# Patient Record
Sex: MALE | Race: WHITE | NOT HISPANIC OR LATINO | Employment: FULL TIME | URBAN - METROPOLITAN AREA
[De-identification: names, ages, dates, MRNs, and addresses within clinical notes are randomized per-mention and may not be internally consistent; named-entity substitution may affect disease eponyms.]

---

## 2017-03-29 DIAGNOSIS — F33.0 MILD RECURRENT MAJOR DEPRESSION: Chronic | ICD-10-CM

## 2017-03-29 RX ORDER — CITALOPRAM 40 MG/1
40 TABLET, FILM COATED ORAL DAILY
Qty: 30 TABLET | Refills: 5 | Status: SHIPPED | OUTPATIENT
Start: 2017-03-29 | End: 2017-06-28 | Stop reason: SDUPTHER

## 2017-05-08 DIAGNOSIS — R73.03 PREDIABETES: ICD-10-CM

## 2017-05-08 RX ORDER — METFORMIN HYDROCHLORIDE 500 MG/1
500 TABLET ORAL 2 TIMES DAILY WITH MEALS
Qty: 60 TABLET | Refills: 0 | Status: SHIPPED | OUTPATIENT
Start: 2017-05-08 | End: 2017-06-28 | Stop reason: SDUPTHER

## 2017-06-09 DIAGNOSIS — I10 ESSENTIAL HYPERTENSION: Chronic | ICD-10-CM

## 2017-06-09 RX ORDER — LOSARTAN POTASSIUM 50 MG/1
25 TABLET ORAL DAILY
Qty: 15 TABLET | Refills: 1 | Status: SHIPPED | OUTPATIENT
Start: 2017-06-09 | End: 2017-06-28 | Stop reason: SDUPTHER

## 2017-06-28 ENCOUNTER — LAB VISIT (OUTPATIENT)
Dept: LAB | Facility: HOSPITAL | Age: 57
End: 2017-06-28
Attending: INTERNAL MEDICINE
Payer: COMMERCIAL

## 2017-06-28 ENCOUNTER — OFFICE VISIT (OUTPATIENT)
Dept: FAMILY MEDICINE | Facility: CLINIC | Age: 57
End: 2017-06-28
Payer: COMMERCIAL

## 2017-06-28 VITALS
HEART RATE: 78 BPM | TEMPERATURE: 98 F | SYSTOLIC BLOOD PRESSURE: 136 MMHG | RESPIRATION RATE: 18 BRPM | HEIGHT: 68 IN | WEIGHT: 202.69 LBS | OXYGEN SATURATION: 97 % | DIASTOLIC BLOOD PRESSURE: 86 MMHG | BODY MASS INDEX: 30.72 KG/M2

## 2017-06-28 DIAGNOSIS — I10 ESSENTIAL HYPERTENSION: Chronic | ICD-10-CM

## 2017-06-28 DIAGNOSIS — G89.29 CHRONIC BILATERAL LOW BACK PAIN WITHOUT SCIATICA: ICD-10-CM

## 2017-06-28 DIAGNOSIS — R73.03 PREDIABETES: ICD-10-CM

## 2017-06-28 DIAGNOSIS — K21.9 GERD WITHOUT ESOPHAGITIS: ICD-10-CM

## 2017-06-28 DIAGNOSIS — F33.0 MILD RECURRENT MAJOR DEPRESSION: Chronic | ICD-10-CM

## 2017-06-28 DIAGNOSIS — M54.50 CHRONIC BILATERAL LOW BACK PAIN WITHOUT SCIATICA: ICD-10-CM

## 2017-06-28 LAB
ANION GAP SERPL CALC-SCNC: 8 MMOL/L
BUN SERPL-MCNC: 14 MG/DL
CALCIUM SERPL-MCNC: 9.1 MG/DL
CHLORIDE SERPL-SCNC: 105 MMOL/L
CHOLEST/HDLC SERPL: 4.5 {RATIO}
CO2 SERPL-SCNC: 27 MMOL/L
CREAT SERPL-MCNC: 1 MG/DL
EST. GFR  (AFRICAN AMERICAN): >60 ML/MIN/1.73 M^2
EST. GFR  (NON AFRICAN AMERICAN): >60 ML/MIN/1.73 M^2
GLUCOSE SERPL-MCNC: 94 MG/DL
HDL/CHOLESTEROL RATIO: 22.3 %
HDLC SERPL-MCNC: 179 MG/DL
HDLC SERPL-MCNC: 40 MG/DL
LDLC SERPL CALC-MCNC: 116 MG/DL
NONHDLC SERPL-MCNC: 139 MG/DL
POTASSIUM SERPL-SCNC: 4.4 MMOL/L
SODIUM SERPL-SCNC: 140 MMOL/L
TRIGL SERPL-MCNC: 115 MG/DL

## 2017-06-28 PROCEDURE — 99214 OFFICE O/P EST MOD 30 MIN: CPT | Mod: S$GLB,,, | Performed by: INTERNAL MEDICINE

## 2017-06-28 PROCEDURE — 36415 COLL VENOUS BLD VENIPUNCTURE: CPT | Mod: PO

## 2017-06-28 PROCEDURE — 80061 LIPID PANEL: CPT

## 2017-06-28 PROCEDURE — 83036 HEMOGLOBIN GLYCOSYLATED A1C: CPT

## 2017-06-28 PROCEDURE — 80048 BASIC METABOLIC PNL TOTAL CA: CPT

## 2017-06-28 PROCEDURE — 99999 PR PBB SHADOW E&M-EST. PATIENT-LVL III: CPT | Mod: PBBFAC,,, | Performed by: INTERNAL MEDICINE

## 2017-06-28 RX ORDER — CITALOPRAM 40 MG/1
40 TABLET, FILM COATED ORAL DAILY
Qty: 90 TABLET | Refills: 3 | Status: SHIPPED | OUTPATIENT
Start: 2017-06-28 | End: 2018-05-25 | Stop reason: SDUPTHER

## 2017-06-28 RX ORDER — METFORMIN HYDROCHLORIDE 500 MG/1
500 TABLET ORAL 2 TIMES DAILY WITH MEALS
Qty: 180 TABLET | Refills: 3 | Status: SHIPPED | OUTPATIENT
Start: 2017-06-28 | End: 2018-05-25

## 2017-06-28 RX ORDER — MELOXICAM 15 MG/1
15 TABLET ORAL DAILY
Qty: 90 TABLET | Refills: 3 | Status: SHIPPED | OUTPATIENT
Start: 2017-06-28 | End: 2018-05-25 | Stop reason: SDUPTHER

## 2017-06-28 RX ORDER — LOSARTAN POTASSIUM 50 MG/1
25 TABLET ORAL DAILY
Qty: 90 TABLET | Refills: 1 | Status: SHIPPED | OUTPATIENT
Start: 2017-06-28 | End: 2018-05-25 | Stop reason: SDUPTHER

## 2017-06-28 NOTE — ASSESSMENT & PLAN NOTE
Controlled with low dose ranitidine.  The current medical regimen is effective;  continue present plan and medications.

## 2017-06-28 NOTE — ASSESSMENT & PLAN NOTE
The current medical regimen is effective;  continue present plan and medications. He is on twice daily metformin currently.  May benefit from once daily extended release.

## 2017-06-28 NOTE — PROGRESS NOTES
Chief Complaint  Chief Complaint   Patient presents with    Annual Exam       HPI  Miquel Luciano is a 56 y.o. male with multiple medical diagnoses as listed in the medical history and problem list that presents for establish care.  Pt is new to me but is known to this clinic with his last appointment being 11/2016.  The patient was previously followed by one of my partners that is no longer at this practice .  I have reviewed their most recent previous OV with their previous PCP, most recent labs and most recent imaging studies and interpreted them myself.  He is taking and tolerating his medications daily.  His HM is up to date.  No CP, SOB, palpitations, leg swelling.  His depression has been well controlled with celexa.  He is only taking a 25mg of losartan.  Not on low sodium diet.        PAST MEDICAL HISTORY:  Past Medical History:   Diagnosis Date    Depression     Hypertension     Prediabetes        PAST SURGICAL HISTORY:  Past Surgical History:   Procedure Laterality Date    COLONOSCOPY N/A 4/8/2016    Procedure: COLONOSCOPY;  Surgeon: Fortino Perez MD;  Location: [facility];  Service: Endoscopy;  Laterality: N/A;    EYE SURGERY      lens implant    FINGER AMPUTATION      NASAL SEPTUM SURGERY      VASECTOMY         SOCIAL HISTORY:  Social History     Social History    Marital status:      Spouse name: N/A    Number of children: N/A    Years of education: N/A     Occupational History    Not on file.     Social History Main Topics    Smoking status: Former Smoker    Smokeless tobacco: Former User     Quit date: 6/12/2015    Alcohol use Not on file    Drug use: Unknown    Sexual activity: Not on file     Other Topics Concern    Not on file     Social History Narrative    No narrative on file       FAMILY HISTORY:  History reviewed. No pertinent family history.    ALLERGIES AND MEDICATIONS: updated and reviewed.  Review of patient's allergies indicates:   Allergen Reactions     Codeine Nausea Only     Current Outpatient Prescriptions   Medication Sig Dispense Refill    citalopram (CELEXA) 40 MG tablet Take 1 tablet (40 mg total) by mouth once daily. 90 tablet 3    fish oil-omega-3 fatty acids 300-1,000 mg capsule Take 2 g by mouth once daily.      losartan (COZAAR) 50 MG tablet Take 0.5 tablets (25 mg total) by mouth once daily. 90 tablet 1    meloxicam (MOBIC) 15 MG tablet Take 1 tablet (15 mg total) by mouth once daily. 90 tablet 3    metformin (GLUCOPHAGE) 500 MG tablet Take 1 tablet (500 mg total) by mouth 2 (two) times daily with meals. 180 tablet 3    ranitidine (ZANTAC) 75 MG tablet Take 75 mg by mouth 2 (two) times daily.       No current facility-administered medications for this visit.          ROS  Review of Systems   Constitutional: Negative for chills, fatigue, fever and unexpected weight change.   HENT: Negative for congestion, dental problem, ear discharge, ear pain, hearing loss, postnasal drip, rhinorrhea, sinus pressure, sore throat and trouble swallowing.    Eyes: Negative for pain, discharge, redness, itching and visual disturbance.   Respiratory: Negative for cough, chest tightness, shortness of breath and wheezing.    Cardiovascular: Negative for chest pain, palpitations and leg swelling.   Gastrointestinal: Negative for abdominal distention, abdominal pain, blood in stool, constipation, diarrhea, nausea and vomiting.        No melena   Endocrine: Negative for cold intolerance, heat intolerance, polydipsia, polyphagia and polyuria.        No nocturia   Genitourinary: Negative for decreased urine volume, difficulty urinating, discharge, dysuria, flank pain, frequency, hematuria, penile pain, penile swelling, scrotal swelling, testicular pain and urgency.   Musculoskeletal: Positive for back pain. Negative for arthralgias, gait problem, joint swelling, myalgias, neck pain and neck stiffness.   Skin: Negative for color change, pallor and rash.   Neurological:  "Negative for dizziness, tremors, seizures, syncope, weakness, light-headedness and headaches.   Hematological: Negative for adenopathy. Does not bruise/bleed easily.   Psychiatric/Behavioral: Negative for confusion, decreased concentration, sleep disturbance and suicidal ideas. The patient is not nervous/anxious.         No depression         Physical Exam  Vitals:    06/28/17 1315 06/28/17 1341   BP: (!) 140/80 136/86   BP Location: Left arm    Patient Position: Sitting    BP Method: Manual    Pulse: 78    Resp: 18    Temp: 97.9 °F (36.6 °C)    TempSrc: Oral    SpO2: 97%    Weight: 92 kg (202 lb 11.4 oz)    Height: 5' 8" (1.727 m)     Body mass index is 30.82 kg/m².  Weight: 92 kg (202 lb 11.4 oz)   Height: 5' 8" (172.7 cm)   General appearance: alert, appears stated age, cooperative and no distress  Head: Normocephalic, without obvious abnormality, atraumatic  Neck: no adenopathy, no carotid bruit, no JVD, supple, symmetrical, trachea midline and thyroid not enlarged, symmetric, no tenderness/mass/nodules  Back: no midline paraspinous or lumbar TTP  Lungs: clear to auscultation bilaterally  Heart: regular rate and rhythm, S1, S2 normal, no murmur, click, rub or gallop  Abdomen: soft, non-tender; bowel sounds normal; no masses,  no organomegaly  Extremities: extremities normal, atraumatic, no cyanosis or edema  Pulses: 2+ and symmetric  Neurologic: Mental status: Alert, oriented, thought content appropriate  Sensory: normal  Motor:grossly normal  Coordination: normal  Gait: Normal  Symmetric facial movements palate elevated symmetrically tongue midline     Health Maintenance       Date Due Completion Date    TETANUS VACCINE 10/16/2019 10/16/2009 (Done)    Override on 10/16/2009: Done    Lipid Panel 11/11/2021 11/11/2016    Colonoscopy 04/08/2026 4/8/2016            Assessment & Plan  Problem List Items Addressed This Visit        Neuro    Mild recurrent major depression (Chronic)    Current Assessment & Plan     " The current medical regimen is effective;  continue present plan and medications.          Relevant Medications    citalopram (CELEXA) 40 MG tablet    Chronic bilateral low back pain without sciatica (Chronic)    Current Assessment & Plan     The current medical regimen is effective;  continue present plan and medications.          Relevant Medications    meloxicam (MOBIC) 15 MG tablet       Cardiac    Essential hypertension (Chronic)    Current Assessment & Plan     The current medical regimen is effective;  continue present plan and medications. Counseled on low sodium diet and information provided on his AVS         Relevant Medications    losartan (COZAAR) 50 MG tablet    Other Relevant Orders    Basic metabolic panel    Lipid panel       Fluids/Electrolytes/Nutrition/GI    GERD without esophagitis (Chronic)    Current Assessment & Plan     Controlled with low dose ranitidine.  The current medical regimen is effective;  continue present plan and medications.             Other    Prediabetes (Chronic)    Current Assessment & Plan     The current medical regimen is effective;  continue present plan and medications. He is on twice daily metformin currently.  May benefit from once daily extended release.           Relevant Medications    metformin (GLUCOPHAGE) 500 MG tablet    Other Relevant Orders    Hemoglobin A1c      Other Visit Diagnoses    None.           Health Maintenance reviewed, up to date.    Follow-up: Return in about 1 year (around 6/28/2018) for Routine Physical.

## 2017-06-28 NOTE — PATIENT INSTRUCTIONS
Low-Salt Diet  This diet removes foods that are high in salt. It also limits the amount of salt you use when cooking. It is most often used for people with high blood pressure, edema (fluid retention), and kidney, liver, or heart disease.  Table salt contains the mineral sodium. Your body needs sodium to work normally. But too much sodium can make your health problems worse. Your healthcare provider is recommending a low-salt (also called low-sodium) diet for you. Your total daily allowance of salt is 1,500 to 2,300 milligrams (mg). It is less than 1 teaspoon of table salt. This means you can have only about 500 to 700 mg of sodium at each meal. People with certain health problems should limit salt intake to the lower end of the recommended range.    When you cook, dont add much salt. If you can cook without using salt, even better. Dont add salt to your food at the table.  When shopping, read food labels. Salt is often called sodium on the label. Choose foods that are salt-free, low salt, or very low salt. Note that foods with reduced salt may not lower your salt intake enough.    Beans, potatoes, and pasta  Ok: Dry beans, split peas, lentils, potatoes, rice, macaroni, pasta, spaghetti without added salt  Avoid: Potato chips, tortilla chips, and similar products  Breads and cereals  Ok: Low-sodium breads, rolls, cereals, and cakes; low-salt crackers, matzo crackers  Avoid: Salted crackers, pretzels, popcorn, Indonesian toast, pancakes, muffins  Dairy  Ok: Milk, chocolate milk, hot chocolate mix, low-salt cheeses, and yogurt  Avoid: Processed cheese and cheese spreads; Roquefort, Camembert, and cottage cheese; buttermilk, instant breakfast drink  Desserts  Ok: Ice cream, frozen yogurt, juice bars, gelatin, cookies and pies, sugar, honey, jelly, hard candy  Avoid: Most pies, cakes and cookies prepared or processed with salt; instant pudding  Drinks  Ok: Tea, coffee, fizzy (carbonated) drinks, juices  Avoid: Flavored  coffees, electrolyte replacement drinks, sports drinks  Meats  Ok: All fresh meat, fish, poultry, low-salt tuna, eggs, egg substitute  Avoid: Smoked, pickled, brine-cured, or salted meats and fish. This includes boss, chipped beef, corned beef, hot dogs, deli meats, ham, kosher meats, salt pork, sausage, canned tuna, salted codfish, smoked salmon, herring, sardines, or anchovies.  Seasonings and spices  Ok: Most seasonings are okay. Good substitutes for salt include: fresh herb blends, hot sauce, lemon, garlic, velasquez, vinegar, dry mustard, parsley, cilantro, horseradish, tomato paste, regular margarine, mayonnaise, unsalted butter, cream cheese, vegetable oil, cream, low-salt salad dressing and gravy.  Avoid: Regular ketchup, relishes, pickles, soy sauce, teriyaki sauce, Worcestershire sauce, BBQ sauce, tartar sauce, meat tenderizer, chili sauce, regular gravy, regular salad dressing, salted butter  Soups  Ok: Low-salt soups and broths made with allowed foods  Avoid: Bouillon cubes, soups with smoked or salted meats, regular soup and broth  Vegetables  Ok: Most vegetables are okay; also low-salt tomato and vegetable juices  Avoid: Sauerkraut and other brine-soaked vegetables; pickles and other pickled vegetables; tomato juice, olives  Date Last Reviewed: 8/1/2016 © 2000-2016 [employer]. [address] [place] [address]. All rights reserved. This information is not intended as a substitute for professional medical care. Always follow your healthcare professional's instructions.        Discharge Instructions: Eating a Low-Salt Diet  Your health care provider has prescribed a low-salt diet for you. Most people with heart problems need to eat less salt, which is full of sodium. Too much sodium is linked to high blood pressure, which is linked to a greater risk of heart disease, stroke, blindness, and kidney problems.  Home care    Learn ways to cut back on salt (sodium):  · Eat less frozen,  canned, dried, packaged, and fast foods. These often contain high amounts of sodium.  · Season foods with herbs instead of salt when you cook.  · Season with flavorings such as pepper, lemon, garlic, and onion.  · Dont add salt to your food at the table.  · Sprinkle salt-free herbal blends on meats and vegetables.  Learn to read food labels carefully:  · Look for the total amount of sodium per serving.  · Look for foods labeled low sodium, reduced sodium, or no added salt.  · Beware. Salt goes by many names. Cut down on foods with these words (all forms of salt) listed as ingredients:  ¨ Salt  ¨ Sodium  ¨ Soy sauce  ¨ Baking soda  ¨ Baking powder  ¨ MSG  ¨ Monosodium  ¨ Na (the chemical symbol for sodium)  Other ideas:  · Use more fresh food. Buy more fruits and vegetables.  · Select lean meats, fish, and poultry.  · Find a cookbook with low-salt recipes. Youll find ideas for tasty meals that are healthy for your heart.  ·   When eating out, ask questions about the menu. Tell the  you're on a low-salt diet.  ¨ If you order fish, chicken, beef, or pork, ask the  to have it broiled, baked, poached, or grilled without salt, butter, or breading.  ¨ Choose plain steamed rice, boiled noodles, and baked or boiled potatoes. Top potatoes with chives and a little sour cream instead of butter.  · Avoid antacids that are high in salt. Check the label before you buy.  Follow-up  Make a follow-up appointment with a nutritionist as directed by our staff.  Date Last Reviewed: 6/20/2015  © 4923-6457 [employer]. [address] [place] [address], [place] [address]. All rights reserved. This information is not intended as a substitute for professional medical care. Always follow your healthcare professional's instructions.

## 2017-06-29 ENCOUNTER — TELEPHONE (OUTPATIENT)
Dept: FAMILY MEDICINE | Facility: CLINIC | Age: 57
End: 2017-06-29

## 2017-06-29 LAB
ESTIMATED AVG GLUCOSE: 128 MG/DL
HBA1C MFR BLD HPLC: 6.1 %

## 2017-06-29 NOTE — TELEPHONE ENCOUNTER
Please inform the patient that his labs look good and the sugars are improved.  His paperwork is ready to     Thank you,  Pepe

## 2018-05-25 ENCOUNTER — LAB VISIT (OUTPATIENT)
Dept: LAB | Facility: HOSPITAL | Age: 58
End: 2018-05-25
Attending: INTERNAL MEDICINE
Payer: COMMERCIAL

## 2018-05-25 ENCOUNTER — OFFICE VISIT (OUTPATIENT)
Dept: FAMILY MEDICINE | Facility: CLINIC | Age: 58
End: 2018-05-25
Payer: COMMERCIAL

## 2018-05-25 VITALS
DIASTOLIC BLOOD PRESSURE: 88 MMHG | BODY MASS INDEX: 30.46 KG/M2 | HEART RATE: 95 BPM | TEMPERATURE: 98 F | SYSTOLIC BLOOD PRESSURE: 132 MMHG | HEIGHT: 68 IN | WEIGHT: 201 LBS | OXYGEN SATURATION: 98 %

## 2018-05-25 DIAGNOSIS — Z00.00 ROUTINE MEDICAL EXAM: Primary | ICD-10-CM

## 2018-05-25 DIAGNOSIS — Z00.00 ROUTINE MEDICAL EXAM: ICD-10-CM

## 2018-05-25 DIAGNOSIS — G89.29 CHRONIC BILATERAL LOW BACK PAIN WITHOUT SCIATICA: Chronic | ICD-10-CM

## 2018-05-25 DIAGNOSIS — I10 ESSENTIAL HYPERTENSION: Chronic | ICD-10-CM

## 2018-05-25 DIAGNOSIS — M54.50 CHRONIC BILATERAL LOW BACK PAIN WITHOUT SCIATICA: Chronic | ICD-10-CM

## 2018-05-25 DIAGNOSIS — R73.03 PREDIABETES: Chronic | ICD-10-CM

## 2018-05-25 DIAGNOSIS — L82.1 SEBORRHEIC KERATOSES: ICD-10-CM

## 2018-05-25 DIAGNOSIS — K21.9 GERD WITHOUT ESOPHAGITIS: Chronic | ICD-10-CM

## 2018-05-25 DIAGNOSIS — F33.0 MILD RECURRENT MAJOR DEPRESSION: Chronic | ICD-10-CM

## 2018-05-25 LAB
ALBUMIN SERPL BCP-MCNC: 3.8 G/DL
ALP SERPL-CCNC: 77 U/L
ALT SERPL W/O P-5'-P-CCNC: 19 U/L
ANION GAP SERPL CALC-SCNC: 7 MMOL/L
AST SERPL-CCNC: 17 U/L
BASOPHILS # BLD AUTO: 0.04 K/UL
BASOPHILS NFR BLD: 0.9 %
BILIRUB SERPL-MCNC: 0.5 MG/DL
BUN SERPL-MCNC: 14 MG/DL
CALCIUM SERPL-MCNC: 9.4 MG/DL
CHLORIDE SERPL-SCNC: 106 MMOL/L
CHOLEST SERPL-MCNC: 171 MG/DL
CHOLEST/HDLC SERPL: 4.5 {RATIO}
CO2 SERPL-SCNC: 28 MMOL/L
CREAT SERPL-MCNC: 1 MG/DL
DIFFERENTIAL METHOD: NORMAL
EOSINOPHIL # BLD AUTO: 0.2 K/UL
EOSINOPHIL NFR BLD: 5.2 %
ERYTHROCYTE [DISTWIDTH] IN BLOOD BY AUTOMATED COUNT: 13.3 %
EST. GFR  (AFRICAN AMERICAN): >60 ML/MIN/1.73 M^2
EST. GFR  (NON AFRICAN AMERICAN): >60 ML/MIN/1.73 M^2
ESTIMATED AVG GLUCOSE: 123 MG/DL
GLUCOSE SERPL-MCNC: 102 MG/DL
HBA1C MFR BLD HPLC: 5.9 %
HCT VFR BLD AUTO: 43.7 %
HDLC SERPL-MCNC: 38 MG/DL
HDLC SERPL: 22.2 %
HGB BLD-MCNC: 14.1 G/DL
IMM GRANULOCYTES # BLD AUTO: 0.01 K/UL
IMM GRANULOCYTES NFR BLD AUTO: 0.2 %
LDLC SERPL CALC-MCNC: 108.4 MG/DL
LYMPHOCYTES # BLD AUTO: 1.4 K/UL
LYMPHOCYTES NFR BLD: 31.3 %
MCH RBC QN AUTO: 28.7 PG
MCHC RBC AUTO-ENTMCNC: 32.3 G/DL
MCV RBC AUTO: 89 FL
MONOCYTES # BLD AUTO: 0.3 K/UL
MONOCYTES NFR BLD: 7.2 %
NEUTROPHILS # BLD AUTO: 2.5 K/UL
NEUTROPHILS NFR BLD: 55.2 %
NONHDLC SERPL-MCNC: 133 MG/DL
NRBC BLD-RTO: 0 /100 WBC
PLATELET # BLD AUTO: 264 K/UL
PMV BLD AUTO: 10.7 FL
POTASSIUM SERPL-SCNC: 4.6 MMOL/L
PROT SERPL-MCNC: 6.7 G/DL
RBC # BLD AUTO: 4.91 M/UL
SODIUM SERPL-SCNC: 141 MMOL/L
TRIGL SERPL-MCNC: 123 MG/DL
WBC # BLD AUTO: 4.44 K/UL

## 2018-05-25 PROCEDURE — 83036 HEMOGLOBIN GLYCOSYLATED A1C: CPT

## 2018-05-25 PROCEDURE — 99999 PR PBB SHADOW E&M-EST. PATIENT-LVL III: CPT | Mod: PBBFAC,,, | Performed by: INTERNAL MEDICINE

## 2018-05-25 PROCEDURE — 85025 COMPLETE CBC W/AUTO DIFF WBC: CPT

## 2018-05-25 PROCEDURE — 99396 PREV VISIT EST AGE 40-64: CPT | Mod: S$GLB,,, | Performed by: INTERNAL MEDICINE

## 2018-05-25 PROCEDURE — 36415 COLL VENOUS BLD VENIPUNCTURE: CPT | Mod: PO

## 2018-05-25 PROCEDURE — 3075F SYST BP GE 130 - 139MM HG: CPT | Mod: CPTII,S$GLB,, | Performed by: INTERNAL MEDICINE

## 2018-05-25 PROCEDURE — 80061 LIPID PANEL: CPT

## 2018-05-25 PROCEDURE — 80053 COMPREHEN METABOLIC PANEL: CPT

## 2018-05-25 PROCEDURE — 3079F DIAST BP 80-89 MM HG: CPT | Mod: CPTII,S$GLB,, | Performed by: INTERNAL MEDICINE

## 2018-05-25 RX ORDER — LOSARTAN POTASSIUM 25 MG/1
25 TABLET ORAL DAILY
Qty: 90 TABLET | Refills: 3 | Status: SHIPPED | OUTPATIENT
Start: 2018-05-25 | End: 2018-08-17

## 2018-05-25 RX ORDER — MELOXICAM 15 MG/1
15 TABLET ORAL DAILY
Qty: 90 TABLET | Refills: 3 | Status: SHIPPED | OUTPATIENT
Start: 2018-05-25 | End: 2019-05-29 | Stop reason: SDUPTHER

## 2018-05-25 RX ORDER — METFORMIN HYDROCHLORIDE 500 MG/1
500 TABLET, EXTENDED RELEASE ORAL
Qty: 90 TABLET | Refills: 3 | Status: SHIPPED | OUTPATIENT
Start: 2018-05-25 | End: 2019-05-29 | Stop reason: SDUPTHER

## 2018-05-25 RX ORDER — CITALOPRAM 40 MG/1
40 TABLET, FILM COATED ORAL DAILY
Qty: 90 TABLET | Refills: 3 | Status: SHIPPED | OUTPATIENT
Start: 2018-05-25 | End: 2019-05-29 | Stop reason: SDUPTHER

## 2018-05-25 NOTE — PROGRESS NOTES
Assessment & Plan  Problem List Items Addressed This Visit        Psychiatric    Mild recurrent major depression (Chronic)    Current Assessment & Plan     The current medical regimen is effective;  continue present plan and medications.          Relevant Medications    citalopram (CELEXA) 40 MG tablet       Cardiac/Vascular    Essential hypertension (Chronic)    Current Assessment & Plan     The current medical regimen is effective;  continue present plan and medications. Continue home monitoring.          Relevant Medications    losartan (COZAAR) 25 MG tablet    Other Relevant Orders    Comprehensive metabolic panel    CBC auto differential    Lipid panel       Endocrine    Prediabetes (Chronic)    Current Assessment & Plan     Change to XR to simplify regimen.          Relevant Medications    metFORMIN (GLUCOPHAGE-XR) 500 MG 24 hr tablet    Other Relevant Orders    Hemoglobin A1c       GI    GERD without esophagitis (Chronic)    Current Assessment & Plan     Continue PRN ranitidine            Orthopedic    Chronic bilateral low back pain without sciatica (Chronic)    Current Assessment & Plan     The current medical regimen is effective;  continue present plan and medications.          Relevant Medications    meloxicam (MOBIC) 15 MG tablet      Other Visit Diagnoses     Routine medical exam    -  Primary  -  Discussed healthy diet, regular exercise, necessary labs, age appropriate cancer screening, and routine vaccinations.  The natural history of prostate cancer and ongoing controversy regarding screening and potential treatment outcomes of prostate cancer has been discussed with the patient. The meaning of a false positive PSA and a false negative PSA has been discussed. He indicates understanding of the limitations of this screening test and wishes NOT to proceed with screening PSA testing.       Relevant Orders    Comprehensive metabolic panel    CBC auto differential    Lipid panel    Hemoglobin A1c     Seborrheic keratoses    -  Referred to derm at pt request    Relevant Orders    Ambulatory consult to Dermatology            Health Maintenance reviewed, up to date.    Follow-up: Follow-up in about 1 year (around 5/25/2019) for Routine Physical.    ______________________________________________________________________    Chief Complaint  Chief Complaint   Patient presents with    Annual Exam       HPI  Miquel Luciano is a 57 y.o. male with multiple medical diagnoses as listed in the medical history and problem list that presents for routine physical.  Pt is known to me with his last appointment 06/2017.      No acute complaints.  He checks his BP at home at times and he reports that it is 130s/85.        PAST MEDICAL HISTORY:  Past Medical History:   Diagnosis Date    Depression     Hypertension     Prediabetes        PAST SURGICAL HISTORY:  Past Surgical History:   Procedure Laterality Date    COLONOSCOPY N/A 4/8/2016    Procedure: COLONOSCOPY;  Surgeon: Fortino Perez MD;  Location: [facility];  Service: Endoscopy;  Laterality: N/A;    EYE SURGERY      lens implant    FINGER AMPUTATION      NASAL SEPTUM SURGERY      VASECTOMY         SOCIAL HISTORY:  Social History     Social History    Marital status:      Spouse name: N/A    Number of children: N/A    Years of education: N/A     Occupational History    Not on file.     Social History Main Topics    Smoking status: Former Smoker    Smokeless tobacco: Former User     Quit date: 6/12/2015    Alcohol use Not on file    Drug use: Unknown    Sexual activity: Not on file     Other Topics Concern    Not on file     Social History Narrative    No narrative on file       FAMILY HISTORY:  History reviewed. No pertinent family history.    ALLERGIES AND MEDICATIONS: updated and reviewed.  Review of patient's allergies indicates:   Allergen Reactions    Codeine Nausea Only     Current Outpatient Prescriptions   Medication Sig Dispense Refill  "   citalopram (CELEXA) 40 MG tablet Take 1 tablet (40 mg total) by mouth once daily. 90 tablet 3    fish oil-omega-3 fatty acids 300-1,000 mg capsule Take 2 g by mouth once daily.      losartan (COZAAR) 25 MG tablet Take 1 tablet (25 mg total) by mouth once daily. 90 tablet 3    meloxicam (MOBIC) 15 MG tablet Take 1 tablet (15 mg total) by mouth once daily. 90 tablet 3    ranitidine (ZANTAC) 75 MG tablet Take 75 mg by mouth 2 (two) times daily.      metFORMIN (GLUCOPHAGE-XR) 500 MG 24 hr tablet Take 1 tablet (500 mg total) by mouth daily with breakfast. 90 tablet 3     No current facility-administered medications for this visit.          ROS  Review of Systems   Constitutional: Negative for chills, fever and unexpected weight change.   HENT: Negative for congestion, dental problem, ear pain, hearing loss, rhinorrhea, sore throat and trouble swallowing.    Eyes: Negative for discharge, redness and visual disturbance.   Respiratory: Negative for cough, chest tightness, shortness of breath and wheezing.    Cardiovascular: Negative for chest pain, palpitations and leg swelling.   Gastrointestinal: Negative for abdominal pain, constipation, diarrhea, nausea and vomiting.   Endocrine: Negative for polydipsia, polyphagia and polyuria.   Genitourinary: Negative for decreased urine volume, dysuria and hematuria.   Musculoskeletal: Positive for back pain. Negative for arthralgias and myalgias.   Skin: Negative for color change and rash.   Neurological: Negative for dizziness, weakness, light-headedness and headaches.   Psychiatric/Behavioral: Negative for decreased concentration, dysphoric mood, sleep disturbance and suicidal ideas.           Physical Exam  Vitals:    05/25/18 1127 05/25/18 1204   BP: (!) 140/100 132/88   Pulse: 95    Temp: 98.3 °F (36.8 °C)    SpO2: 98%    Weight: 91.2 kg (201 lb)    Height: 5' 8" (1.727 m)     Body mass index is 30.56 kg/m².  Weight: 91.2 kg (201 lb)   Height: 5' 8" (172.7 cm) "   Physical Exam   Constitutional: He is oriented to person, place, and time. He appears well-developed and well-nourished. No distress.   HENT:   Head: Normocephalic and atraumatic.   Right Ear: Tympanic membrane, external ear and ear canal normal.   Left Ear: Tympanic membrane, external ear and ear canal normal.   Nose: Nose normal. No septal deviation. Right sinus exhibits no maxillary sinus tenderness and no frontal sinus tenderness. Left sinus exhibits no maxillary sinus tenderness and no frontal sinus tenderness.   Mouth/Throat: Uvula is midline, oropharynx is clear and moist and mucous membranes are normal. No tonsillar exudate.   Eyes: Conjunctivae and EOM are normal. Pupils are equal, round, and reactive to light. Right eye exhibits no discharge. Left eye exhibits no discharge. No scleral icterus.   Neck: Neck supple. No JVD present. No spinous process tenderness and no muscular tenderness present. Carotid bruit is not present. No tracheal deviation present. No thyroid mass and no thyromegaly present.   Cardiovascular: Normal rate, regular rhythm, normal heart sounds and intact distal pulses.  Exam reveals no S3, no S4 and no friction rub.    No murmur heard.  Pulmonary/Chest: Effort normal and breath sounds normal. He has no wheezes. He has no rhonchi. He has no rales.   Abdominal: Soft. Bowel sounds are normal. He exhibits no distension. There is no tenderness. There is no rebound, no guarding and no CVA tenderness.   Musculoskeletal: Normal range of motion. He exhibits no edema or tenderness.   Lymphadenopathy:        Head (right side): No submental and no submandibular adenopathy present.        Head (left side): No submental and no submandibular adenopathy present.     He has no cervical adenopathy.   Neurological: He is alert and oriented to person, place, and time. Coordination normal.   Motor grossly intact.  Sensation grossly normal.  Symmetric facial movements palate elevated symmetrically tongue  midline    Skin: Skin is warm and dry. Capillary refill takes less than 2 seconds. No rash noted. No cyanosis. Nails show no clubbing.   Psychiatric: He has a normal mood and affect. His speech is normal and behavior is normal. Thought content normal. Cognition and memory are normal.         Health Maintenance       Date Due Completion Date    TETANUS VACCINE 10/16/2019 10/16/2009 (Done)    Override on 10/16/2009: Done    Lipid Panel 06/28/2022 6/28/2017    Colonoscopy 04/08/2026 4/8/2016

## 2018-05-25 NOTE — ASSESSMENT & PLAN NOTE
The current medical regimen is effective;  continue present plan and medications. Continue home monitoring.

## 2018-05-28 ENCOUNTER — TELEPHONE (OUTPATIENT)
Dept: FAMILY MEDICINE | Facility: CLINIC | Age: 58
End: 2018-05-28

## 2018-05-28 NOTE — TELEPHONE ENCOUNTER
Please inform the patient that this labs looked stable to slightly improved.      I filled out his [employer] paperwork for his insurance and it is ready to fax.     Thank you,  Pepe

## 2018-06-01 ENCOUNTER — TELEPHONE (OUTPATIENT)
Dept: FAMILY MEDICINE | Facility: CLINIC | Age: 58
End: 2018-06-01

## 2018-06-01 NOTE — TELEPHONE ENCOUNTER
"----- Message from Dori Soliman sent at 6/1/2018  2:48 PM CDT -----  Contact: Self/ [phone]  Pt returning call to office about his "physical" paperwork. Thank you.  "

## 2018-08-16 DIAGNOSIS — I10 ESSENTIAL HYPERTENSION: Chronic | ICD-10-CM

## 2018-08-17 RX ORDER — LOSARTAN POTASSIUM 50 MG/1
TABLET ORAL
Qty: 15 TABLET | Refills: 11 | Status: SHIPPED | OUTPATIENT
Start: 2018-08-17 | End: 2019-09-12 | Stop reason: SDUPTHER

## 2018-10-04 DIAGNOSIS — R73.03 PREDIABETES: ICD-10-CM

## 2018-10-05 RX ORDER — METFORMIN HYDROCHLORIDE 500 MG/1
TABLET ORAL
Qty: 90 TABLET | Refills: 1 | OUTPATIENT
Start: 2018-10-05

## 2019-05-29 DIAGNOSIS — R73.03 PREDIABETES: Chronic | ICD-10-CM

## 2019-05-29 DIAGNOSIS — M54.50 CHRONIC BILATERAL LOW BACK PAIN WITHOUT SCIATICA: Chronic | ICD-10-CM

## 2019-05-29 DIAGNOSIS — F33.0 MILD RECURRENT MAJOR DEPRESSION: Chronic | ICD-10-CM

## 2019-05-29 DIAGNOSIS — G89.29 CHRONIC BILATERAL LOW BACK PAIN WITHOUT SCIATICA: Chronic | ICD-10-CM

## 2019-05-29 DIAGNOSIS — I10 ESSENTIAL HYPERTENSION: Chronic | ICD-10-CM

## 2019-05-30 RX ORDER — CITALOPRAM 40 MG/1
TABLET, FILM COATED ORAL
Qty: 90 TABLET | Refills: 0 | Status: SHIPPED | OUTPATIENT
Start: 2019-05-30 | End: 2019-09-12 | Stop reason: SDUPTHER

## 2019-05-30 RX ORDER — MELOXICAM 15 MG/1
TABLET ORAL
Qty: 90 TABLET | Refills: 0 | Status: SHIPPED | OUTPATIENT
Start: 2019-05-30 | End: 2019-09-12 | Stop reason: SDUPTHER

## 2019-05-30 RX ORDER — METFORMIN HYDROCHLORIDE 500 MG/1
TABLET, EXTENDED RELEASE ORAL
Qty: 90 TABLET | Refills: 0 | Status: SHIPPED | OUTPATIENT
Start: 2019-05-30 | End: 2019-09-12 | Stop reason: SDUPTHER

## 2019-09-12 DIAGNOSIS — R73.03 PREDIABETES: Chronic | ICD-10-CM

## 2019-09-12 DIAGNOSIS — I10 ESSENTIAL HYPERTENSION: Chronic | ICD-10-CM

## 2019-09-12 DIAGNOSIS — G89.29 CHRONIC BILATERAL LOW BACK PAIN WITHOUT SCIATICA: Chronic | ICD-10-CM

## 2019-09-12 DIAGNOSIS — M54.50 CHRONIC BILATERAL LOW BACK PAIN WITHOUT SCIATICA: Chronic | ICD-10-CM

## 2019-09-12 DIAGNOSIS — F33.0 MILD RECURRENT MAJOR DEPRESSION: Chronic | ICD-10-CM

## 2019-09-12 NOTE — LETTER
September 24, 2019    Miquel Luciano  [address]  [place] [address]             [place] [address] [facility] [address]  Phone: [phone]  Fax: [phone] Dear Mr. Luciano:    We are writing to you because we have made multiple attempts to reach you by phone and have been unsuccessful.      Your physician has authorized your requested medication refills on 09/13/2019 for a one month supply.  You will need to scheduled a follow up appointment with your PCP prior to any future refills.     Please call us at your earliest convenience.      Sincerely,    [facility]

## 2019-09-13 RX ORDER — MELOXICAM 15 MG/1
TABLET ORAL
Qty: 90 TABLET | Refills: 0 | Status: SHIPPED | OUTPATIENT
Start: 2019-09-13 | End: 2019-10-12

## 2019-09-13 RX ORDER — METFORMIN HYDROCHLORIDE 500 MG/1
TABLET, EXTENDED RELEASE ORAL
Qty: 90 TABLET | Refills: 0 | Status: SHIPPED | OUTPATIENT
Start: 2019-09-13 | End: 2019-12-20

## 2019-09-13 RX ORDER — LOSARTAN POTASSIUM 50 MG/1
TABLET ORAL
Qty: 15 TABLET | Refills: 0 | Status: SHIPPED | OUTPATIENT
Start: 2019-09-13 | End: 2019-10-12 | Stop reason: SDUPTHER

## 2019-09-13 RX ORDER — CITALOPRAM 40 MG/1
TABLET, FILM COATED ORAL
Qty: 90 TABLET | Refills: 0 | Status: SHIPPED | OUTPATIENT
Start: 2019-09-13 | End: 2019-12-20

## 2019-09-13 NOTE — TELEPHONE ENCOUNTER
Refill approved x 1 only. Patient needs office visit further refills. Please address HM, if applicable.

## 2019-09-24 NOTE — TELEPHONE ENCOUNTER
Called Patient.  No answer x 2 prior attempts.  Unable to leave a voice message requesting a call back.  Letter sent.

## 2019-10-12 ENCOUNTER — OFFICE VISIT (OUTPATIENT)
Dept: FAMILY MEDICINE | Facility: CLINIC | Age: 59
End: 2019-10-12
Payer: COMMERCIAL

## 2019-10-12 VITALS
SYSTOLIC BLOOD PRESSURE: 151 MMHG | DIASTOLIC BLOOD PRESSURE: 92 MMHG | BODY MASS INDEX: 31.71 KG/M2 | WEIGHT: 209.19 LBS | HEIGHT: 68 IN | HEART RATE: 75 BPM

## 2019-10-12 DIAGNOSIS — K21.9 GERD WITHOUT ESOPHAGITIS: Chronic | ICD-10-CM

## 2019-10-12 DIAGNOSIS — I10 ESSENTIAL HYPERTENSION: Primary | Chronic | ICD-10-CM

## 2019-10-12 DIAGNOSIS — Z23 NEED FOR INFLUENZA VACCINATION: ICD-10-CM

## 2019-10-12 DIAGNOSIS — Z13.6 ENCOUNTER FOR LIPID SCREENING FOR CARDIOVASCULAR DISEASE: ICD-10-CM

## 2019-10-12 DIAGNOSIS — R73.03 PREDIABETES: Chronic | ICD-10-CM

## 2019-10-12 DIAGNOSIS — Z13.220 ENCOUNTER FOR LIPID SCREENING FOR CARDIOVASCULAR DISEASE: ICD-10-CM

## 2019-10-12 PROCEDURE — 99214 PR OFFICE/OUTPT VISIT, EST, LEVL IV, 30-39 MIN: ICD-10-PCS | Mod: 25,S$GLB,, | Performed by: FAMILY MEDICINE

## 2019-10-12 PROCEDURE — 3080F PR MOST RECENT DIASTOLIC BLOOD PRESSURE >= 90 MM HG: ICD-10-PCS | Mod: CPTII,S$GLB,, | Performed by: FAMILY MEDICINE

## 2019-10-12 PROCEDURE — 3008F PR BODY MASS INDEX (BMI) DOCUMENTED: ICD-10-PCS | Mod: CPTII,S$GLB,, | Performed by: FAMILY MEDICINE

## 2019-10-12 PROCEDURE — 90686 IIV4 VACC NO PRSV 0.5 ML IM: CPT | Mod: S$GLB,,, | Performed by: FAMILY MEDICINE

## 2019-10-12 PROCEDURE — 99214 OFFICE O/P EST MOD 30 MIN: CPT | Mod: 25,S$GLB,, | Performed by: FAMILY MEDICINE

## 2019-10-12 PROCEDURE — 99999 PR PBB SHADOW E&M-EST. PATIENT-LVL III: CPT | Mod: PBBFAC,,, | Performed by: FAMILY MEDICINE

## 2019-10-12 PROCEDURE — 90471 IMMUNIZATION ADMIN: CPT | Mod: S$GLB,,, | Performed by: FAMILY MEDICINE

## 2019-10-12 PROCEDURE — 99999 PR PBB SHADOW E&M-EST. PATIENT-LVL III: ICD-10-PCS | Mod: PBBFAC,,, | Performed by: FAMILY MEDICINE

## 2019-10-12 PROCEDURE — 3077F SYST BP >= 140 MM HG: CPT | Mod: CPTII,S$GLB,, | Performed by: FAMILY MEDICINE

## 2019-10-12 PROCEDURE — 90471 FLU VACCINE (QUAD) GREATER THAN OR EQUAL TO 3YO PRESERVATIVE FREE IM: ICD-10-PCS | Mod: S$GLB,,, | Performed by: FAMILY MEDICINE

## 2019-10-12 PROCEDURE — 3077F PR MOST RECENT SYSTOLIC BLOOD PRESSURE >= 140 MM HG: ICD-10-PCS | Mod: CPTII,S$GLB,, | Performed by: FAMILY MEDICINE

## 2019-10-12 PROCEDURE — 3080F DIAST BP >= 90 MM HG: CPT | Mod: CPTII,S$GLB,, | Performed by: FAMILY MEDICINE

## 2019-10-12 PROCEDURE — 90686 FLU VACCINE (QUAD) GREATER THAN OR EQUAL TO 3YO PRESERVATIVE FREE IM: ICD-10-PCS | Mod: S$GLB,,, | Performed by: FAMILY MEDICINE

## 2019-10-12 PROCEDURE — 3008F BODY MASS INDEX DOCD: CPT | Mod: CPTII,S$GLB,, | Performed by: FAMILY MEDICINE

## 2019-10-12 RX ORDER — LOSARTAN POTASSIUM 50 MG/1
50 TABLET ORAL DAILY
Qty: 45 TABLET | Refills: 1 | Status: SHIPPED | OUTPATIENT
Start: 2019-10-12 | End: 2020-01-10

## 2019-10-12 NOTE — PROGRESS NOTES
Chief Complaint   Patient presents with    Diabetes    Hypertension       HPI  Miquel Luciano is a 59 y.o. male with multiple medical diagnoses as listed in the medical history and problem list that presents for follow-up for DM and HTN    DM- he checks his sugars, has been running at 105 but checks it when he feels like it    PAST MEDICAL HISTORY:  Past Medical History:   Diagnosis Date    Depression     Hypertension     Prediabetes        PAST SURGICAL HISTORY:  Past Surgical History:   Procedure Laterality Date    COLONOSCOPY N/A 4/8/2016    Procedure: COLONOSCOPY;  Surgeon: Fortino Perez MD;  Location: [facility];  Service: Endoscopy;  Laterality: N/A;    EYE SURGERY      lens implant    FINGER AMPUTATION      NASAL SEPTUM SURGERY      VASECTOMY         SOCIAL HISTORY:  Social History     Socioeconomic History    Marital status:      Spouse name: Not on file    Number of children: Not on file    Years of education: Not on file    Highest education level: Not on file   Occupational History    Not on file   Social Needs    Financial resource strain: Not on file    Food insecurity:     Worry: Not on file     Inability: Not on file    Transportation needs:     Medical: Not on file     Non-medical: Not on file   Tobacco Use    Smoking status: Former Smoker    Smokeless tobacco: Former User     Quit date: 6/12/2015   Substance and Sexual Activity    Alcohol use: Not on file    Drug use: Not on file    Sexual activity: Not on file   Lifestyle    Physical activity:     Days per week: Not on file     Minutes per session: Not on file    Stress: Not on file   Relationships    Social connections:     Talks on phone: Not on file     Gets together: Not on file     Attends Sikhism service: Not on file     Active member of club or organization: Not on file     Attends meetings of clubs or organizations: Not on file     Relationship status: Not on file   Other Topics Concern    Not on  "file   Social History Narrative    Not on file       FAMILY HISTORY:  No family history on file.    ALLERGIES AND MEDICATIONS: updated and reviewed.  Review of patient's allergies indicates:   Allergen Reactions    Codeine Nausea Only     Current Outpatient Medications   Medication Sig Dispense Refill    citalopram (CELEXA) 40 MG tablet TAKE 1 TABLET BY MOUTH ONCE DAILY 90 tablet 0    fish oil-omega-3 fatty acids 300-1,000 mg capsule Take 2 g by mouth once daily.      losartan (COZAAR) 50 MG tablet Take 1 tablet (50 mg total) by mouth once daily. 45 tablet 1    metFORMIN (GLUCOPHAGE-XR) 500 MG 24 hr tablet TAKE 1 TABLET BY MOUTH ONCE DAILY WITH BREAKFAST 90 tablet 0    ranitidine (ZANTAC) 75 MG tablet Take 75 mg by mouth 2 (two) times daily.       No current facility-administered medications for this visit.        ROS  Review of Systems   Constitutional: Negative for chills, fatigue, fever and unexpected weight change.   HENT: Negative for ear pain, postnasal drip, rhinorrhea, sinus pressure and sore throat.    Eyes: Negative for photophobia and visual disturbance.   Respiratory: Negative for apnea, cough, chest tightness, shortness of breath and wheezing.    Cardiovascular: Negative for chest pain and palpitations.   Gastrointestinal: Negative for abdominal pain, blood in stool, constipation, diarrhea, nausea and vomiting.   Genitourinary: Negative for difficulty urinating.   Musculoskeletal: Negative for arthralgias and joint swelling.   Skin: Negative for rash.   Neurological: Negative for facial asymmetry, speech difficulty, weakness, numbness and headaches.   Psychiatric/Behavioral: Negative for dysphoric mood.       Physical Exam  Vitals:    10/12/19 1323   BP: (!) 151/92   BP Location: Right arm   Patient Position: Sitting   BP Method: Large (Automatic)   Pulse: 75   Weight: 94.9 kg (209 lb 3.5 oz)   Height: 5' 8" (1.727 m)    Body mass index is 31.81 kg/m².  Weight: 94.9 kg (209 lb 3.5 oz)   Height: " "5' 8" (172.7 cm)     Physical Exam   Constitutional: He is oriented to person, place, and time. He appears well-developed and well-nourished.   HENT:   Head: Normocephalic and atraumatic.   Mouth/Throat: No oropharyngeal exudate.   Eyes: EOM are normal.   Cardiovascular: Normal rate, regular rhythm and normal heart sounds. Exam reveals no gallop and no friction rub.   No murmur heard.  Pulmonary/Chest: Effort normal and breath sounds normal. No respiratory distress. He has no wheezes. He has no rales. He exhibits no tenderness.   Lymphadenopathy:     He has no cervical adenopathy.   Neurological: He is alert and oriented to person, place, and time.   Skin: Skin is warm and dry.   Psychiatric: He has a normal mood and affect. His behavior is normal.   Nursing note and vitals reviewed.      Health Maintenance       Date Due Completion Date    Shingles Vaccine (1 of 2) 10/01/2011 ---    Influenza Vaccine (1) 09/01/2019 11/11/2016    TETANUS VACCINE 10/16/2019 10/16/2009 (Done)    Override on 10/16/2009: Done    Lipid Panel 05/25/2023 5/25/2018    Colonoscopy 04/08/2026 4/8/2016          Health maintenance reviewed and addressed as ordered      ASSESSMENT     1. Essential hypertension    2. GERD without esophagitis    3. Prediabetes    4. Need for influenza vaccination    5. Encounter for lipid screening for cardiovascular disease        PLAN:     Problem List Items Addressed This Visit        Cardiac/Vascular    Essential hypertension - Primary (Chronic)  -needs nurse BP visit for recheck  -will schedule for one week    Relevant Medications    losartan (COZAAR) 50 MG tablet    Other Relevant Orders    CBC auto differential    Comprehensive metabolic panel       Endocrine    Prediabetes (Chronic)  -due for lab work    Relevant Orders    Hemoglobin A1c       GI    GERD without esophagitis (Chronic)  -continue zantac for prn use      Other Visit Diagnoses     Need for influenza vaccination      -as ordered       Relevant " Orders    Influenza - Quadrivalent (3 years & older) (PF) (Completed)    Encounter for lipid screening for cardiovascular disease      -as ordered       Relevant Orders    Lipid panel            Jessica Mon MD  10/12/2019 1:36 PM        Follow up in about 6 months (around 4/12/2020) for Follow up.

## 2019-10-14 ENCOUNTER — TELEPHONE (OUTPATIENT)
Dept: FAMILY MEDICINE | Facility: CLINIC | Age: 59
End: 2019-10-14

## 2019-10-14 NOTE — TELEPHONE ENCOUNTER
----- Message from Celeste Saab sent at 10/14/2019  8:55 AM CDT -----  Contact: Patient   Type: Patient Call Back    Who called: Patient     What is the request in detail: Pt is requesting a call back. Pt wants to know if he could get his BP checked on 10/19/2019 while getting his blood work. Pt states that he has to work during the week.     Can the clinic reply by GENISAMANDA? No     Would the patient rather a call back or a response via [facility]? Call back     Best call back number: [phone]

## 2019-10-19 ENCOUNTER — LAB VISIT (OUTPATIENT)
Dept: LAB | Facility: HOSPITAL | Age: 59
End: 2019-10-19
Attending: FAMILY MEDICINE
Payer: COMMERCIAL

## 2019-10-19 DIAGNOSIS — Z13.6 ENCOUNTER FOR LIPID SCREENING FOR CARDIOVASCULAR DISEASE: ICD-10-CM

## 2019-10-19 DIAGNOSIS — R73.03 PREDIABETES: Chronic | ICD-10-CM

## 2019-10-19 DIAGNOSIS — I10 ESSENTIAL HYPERTENSION: Chronic | ICD-10-CM

## 2019-10-19 DIAGNOSIS — Z13.220 ENCOUNTER FOR LIPID SCREENING FOR CARDIOVASCULAR DISEASE: ICD-10-CM

## 2019-10-19 LAB
ALBUMIN SERPL BCP-MCNC: 3.7 G/DL (ref 3.5–5.2)
ALP SERPL-CCNC: 89 U/L (ref 55–135)
ALT SERPL W/O P-5'-P-CCNC: 14 U/L (ref 10–44)
ANION GAP SERPL CALC-SCNC: 8 MMOL/L (ref 8–16)
AST SERPL-CCNC: 16 U/L (ref 10–40)
BASOPHILS # BLD AUTO: 0.04 K/UL (ref 0–0.2)
BASOPHILS NFR BLD: 1 % (ref 0–1.9)
BILIRUB SERPL-MCNC: 0.4 MG/DL (ref 0.1–1)
BUN SERPL-MCNC: 13 MG/DL (ref 6–20)
CALCIUM SERPL-MCNC: 9.3 MG/DL (ref 8.7–10.5)
CHLORIDE SERPL-SCNC: 107 MMOL/L (ref 95–110)
CHOLEST SERPL-MCNC: 179 MG/DL (ref 120–199)
CHOLEST/HDLC SERPL: 4.6 {RATIO} (ref 2–5)
CO2 SERPL-SCNC: 27 MMOL/L (ref 23–29)
CREAT SERPL-MCNC: 1.1 MG/DL (ref 0.5–1.4)
DIFFERENTIAL METHOD: ABNORMAL
EOSINOPHIL # BLD AUTO: 0.2 K/UL (ref 0–0.5)
EOSINOPHIL NFR BLD: 4.7 % (ref 0–8)
ERYTHROCYTE [DISTWIDTH] IN BLOOD BY AUTOMATED COUNT: 13 % (ref 11.5–14.5)
EST. GFR  (AFRICAN AMERICAN): >60 ML/MIN/1.73 M^2
EST. GFR  (NON AFRICAN AMERICAN): >60 ML/MIN/1.73 M^2
ESTIMATED AVG GLUCOSE: 137 MG/DL (ref 68–131)
GLUCOSE SERPL-MCNC: 156 MG/DL (ref 70–110)
HBA1C MFR BLD HPLC: 6.4 % (ref 4–5.6)
HCT VFR BLD AUTO: 44.5 % (ref 40–54)
HDLC SERPL-MCNC: 39 MG/DL (ref 40–75)
HDLC SERPL: 21.8 % (ref 20–50)
HGB BLD-MCNC: 14.2 G/DL (ref 14–18)
IMM GRANULOCYTES # BLD AUTO: 0.02 K/UL (ref 0–0.04)
IMM GRANULOCYTES NFR BLD AUTO: 0.5 % (ref 0–0.5)
LDLC SERPL CALC-MCNC: 118 MG/DL (ref 63–159)
LYMPHOCYTES # BLD AUTO: 1.3 K/UL (ref 1–4.8)
LYMPHOCYTES NFR BLD: 32.7 % (ref 18–48)
MCH RBC QN AUTO: 28.6 PG (ref 27–31)
MCHC RBC AUTO-ENTMCNC: 31.9 G/DL (ref 32–36)
MCV RBC AUTO: 90 FL (ref 82–98)
MONOCYTES # BLD AUTO: 0.2 K/UL (ref 0.3–1)
MONOCYTES NFR BLD: 5.4 % (ref 4–15)
NEUTROPHILS # BLD AUTO: 2.3 K/UL (ref 1.8–7.7)
NEUTROPHILS NFR BLD: 55.7 % (ref 38–73)
NONHDLC SERPL-MCNC: 140 MG/DL
NRBC BLD-RTO: 0 /100 WBC
PLATELET # BLD AUTO: 258 K/UL (ref 150–350)
PMV BLD AUTO: 10.6 FL (ref 9.2–12.9)
POTASSIUM SERPL-SCNC: 4.9 MMOL/L (ref 3.5–5.1)
PROT SERPL-MCNC: 6.9 G/DL (ref 6–8.4)
RBC # BLD AUTO: 4.97 M/UL (ref 4.6–6.2)
SODIUM SERPL-SCNC: 142 MMOL/L (ref 136–145)
TRIGL SERPL-MCNC: 110 MG/DL (ref 30–150)
WBC # BLD AUTO: 4.07 K/UL (ref 3.9–12.7)

## 2019-10-19 PROCEDURE — 85025 COMPLETE CBC W/AUTO DIFF WBC: CPT

## 2019-10-19 PROCEDURE — 83036 HEMOGLOBIN GLYCOSYLATED A1C: CPT

## 2019-10-19 PROCEDURE — 80061 LIPID PANEL: CPT

## 2019-10-19 PROCEDURE — 80053 COMPREHEN METABOLIC PANEL: CPT

## 2019-10-19 PROCEDURE — 36415 COLL VENOUS BLD VENIPUNCTURE: CPT | Mod: PO

## 2019-10-21 DIAGNOSIS — I10 ESSENTIAL HYPERTENSION: Chronic | ICD-10-CM

## 2019-10-21 RX ORDER — LOSARTAN POTASSIUM 50 MG/1
TABLET ORAL
Qty: 15 TABLET | Refills: 0 | Status: SHIPPED | OUTPATIENT
Start: 2019-10-21 | End: 2020-02-28

## 2019-11-04 ENCOUNTER — TELEPHONE (OUTPATIENT)
Dept: FAMILY MEDICINE | Facility: CLINIC | Age: 59
End: 2019-11-04

## 2019-11-04 NOTE — TELEPHONE ENCOUNTER
"----- Message from Elizabeth Garcia sent at 11/4/2019 12:16 PM CST -----  Contact: Madyson wife [phone]  .Type: RX Refill Request    Who Called: Madyson "wife"     Have you contacted your pharmacy: yes    Refill or New Rx: refill     RX Name and Strength: losartan (COZAAR) 50 MG tablet      Is this a 30 day or 90 day RX: 30 day    Preferred Pharmacy with phone number: .  [facility] - [place], [place] - [address] [facility]  [address] [place] [id number]  Phone: [phone] Fax: [phone]    Local or Mail Order: local     Would the patient rather a call back or a response via [facility]?  Call back     Best Call Back Number: [phone]        "

## 2019-12-19 DIAGNOSIS — R73.03 PREDIABETES: Chronic | ICD-10-CM

## 2019-12-19 DIAGNOSIS — F33.0 MILD RECURRENT MAJOR DEPRESSION: Chronic | ICD-10-CM

## 2019-12-20 RX ORDER — METFORMIN HYDROCHLORIDE 500 MG/1
TABLET, EXTENDED RELEASE ORAL
Qty: 90 TABLET | Refills: 0 | Status: SHIPPED | OUTPATIENT
Start: 2019-12-20 | End: 2020-04-20

## 2019-12-20 RX ORDER — CITALOPRAM 40 MG/1
TABLET, FILM COATED ORAL
Qty: 90 TABLET | Refills: 0 | Status: SHIPPED | OUTPATIENT
Start: 2019-12-20 | End: 2020-04-20

## 2020-02-27 DIAGNOSIS — I10 ESSENTIAL HYPERTENSION: Chronic | ICD-10-CM

## 2020-02-27 DIAGNOSIS — Z00.00 ROUTINE MEDICAL EXAM: Primary | ICD-10-CM

## 2020-02-28 RX ORDER — LOSARTAN POTASSIUM 50 MG/1
50 TABLET ORAL DAILY
Qty: 90 TABLET | Refills: 0 | Status: SHIPPED | OUTPATIENT
Start: 2020-02-28 | End: 2020-07-13

## 2020-02-28 NOTE — TELEPHONE ENCOUNTER
Refill approved.  Due for Routine physical in April with labs prior.  Please sched.    Thank you,  Pepe

## 2020-02-28 NOTE — TELEPHONE ENCOUNTER
I have attempted without success to contact this patient by phone and M for patient to return call to schedule physical and labs.

## 2020-04-20 DIAGNOSIS — R73.03 PREDIABETES: Chronic | ICD-10-CM

## 2020-04-20 DIAGNOSIS — F33.0 MILD RECURRENT MAJOR DEPRESSION: Chronic | ICD-10-CM

## 2020-04-20 RX ORDER — CITALOPRAM 40 MG/1
TABLET, FILM COATED ORAL
Qty: 90 TABLET | Refills: 0 | Status: SHIPPED | OUTPATIENT
Start: 2020-04-20 | End: 2020-09-02

## 2020-04-20 RX ORDER — METFORMIN HYDROCHLORIDE 500 MG/1
TABLET, EXTENDED RELEASE ORAL
Qty: 90 TABLET | Refills: 0 | Status: SHIPPED | OUTPATIENT
Start: 2020-04-20 | End: 2020-09-02

## 2020-04-20 NOTE — TELEPHONE ENCOUNTER
Refill approved.  Due for virtual chronic conditions follow up with labs prior.  Please sched. Me or Glory    Thank you,  Pepe

## 2020-07-12 DIAGNOSIS — I10 ESSENTIAL HYPERTENSION: Chronic | ICD-10-CM

## 2020-07-13 RX ORDER — LOSARTAN POTASSIUM 50 MG/1
TABLET ORAL
Qty: 90 TABLET | Refills: 0 | Status: SHIPPED | OUTPATIENT
Start: 2020-07-13 | End: 2020-10-05 | Stop reason: SDUPTHER

## 2020-07-13 NOTE — TELEPHONE ENCOUNTER
Refill approved.  Due for f/u DM with labs prior.  Please sched with me or Glory.  Virtual ok.     Thank you,  Pepe

## 2020-09-21 ENCOUNTER — PATIENT OUTREACH (OUTPATIENT)
Dept: ADMINISTRATIVE | Facility: HOSPITAL | Age: 60
End: 2020-09-21

## 2020-09-26 ENCOUNTER — LAB VISIT (OUTPATIENT)
Dept: LAB | Facility: HOSPITAL | Age: 60
End: 2020-09-26
Attending: INTERNAL MEDICINE
Payer: COMMERCIAL

## 2020-09-26 DIAGNOSIS — Z00.00 ROUTINE MEDICAL EXAM: ICD-10-CM

## 2020-09-26 LAB
ALBUMIN SERPL BCP-MCNC: 3.8 G/DL (ref 3.5–5.2)
ALP SERPL-CCNC: 79 U/L (ref 55–135)
ALT SERPL W/O P-5'-P-CCNC: 15 U/L (ref 10–44)
ANION GAP SERPL CALC-SCNC: 8 MMOL/L (ref 8–16)
AST SERPL-CCNC: 19 U/L (ref 10–40)
BASOPHILS # BLD AUTO: 0.04 K/UL (ref 0–0.2)
BASOPHILS NFR BLD: 0.7 % (ref 0–1.9)
BILIRUB SERPL-MCNC: 0.5 MG/DL (ref 0.1–1)
BUN SERPL-MCNC: 12 MG/DL (ref 6–20)
CALCIUM SERPL-MCNC: 8.7 MG/DL (ref 8.7–10.5)
CHLORIDE SERPL-SCNC: 106 MMOL/L (ref 95–110)
CHOLEST SERPL-MCNC: 167 MG/DL (ref 120–199)
CHOLEST/HDLC SERPL: 4.1 {RATIO} (ref 2–5)
CO2 SERPL-SCNC: 26 MMOL/L (ref 23–29)
CREAT SERPL-MCNC: 1 MG/DL (ref 0.5–1.4)
DIFFERENTIAL METHOD: ABNORMAL
EOSINOPHIL # BLD AUTO: 0.2 K/UL (ref 0–0.5)
EOSINOPHIL NFR BLD: 3.9 % (ref 0–8)
ERYTHROCYTE [DISTWIDTH] IN BLOOD BY AUTOMATED COUNT: 13.4 % (ref 11.5–14.5)
EST. GFR  (AFRICAN AMERICAN): >60 ML/MIN/1.73 M^2
EST. GFR  (NON AFRICAN AMERICAN): >60 ML/MIN/1.73 M^2
ESTIMATED AVG GLUCOSE: 126 MG/DL (ref 68–131)
GLUCOSE SERPL-MCNC: 106 MG/DL (ref 70–110)
HBA1C MFR BLD HPLC: 6 % (ref 4–5.6)
HCT VFR BLD AUTO: 43.8 % (ref 40–54)
HDLC SERPL-MCNC: 41 MG/DL (ref 40–75)
HDLC SERPL: 24.6 % (ref 20–50)
HGB BLD-MCNC: 13.7 G/DL (ref 14–18)
IMM GRANULOCYTES # BLD AUTO: 0.02 K/UL (ref 0–0.04)
IMM GRANULOCYTES NFR BLD AUTO: 0.4 % (ref 0–0.5)
LDLC SERPL CALC-MCNC: 111 MG/DL (ref 63–159)
LYMPHOCYTES # BLD AUTO: 1.9 K/UL (ref 1–4.8)
LYMPHOCYTES NFR BLD: 33 % (ref 18–48)
MCH RBC QN AUTO: 28.4 PG (ref 27–31)
MCHC RBC AUTO-ENTMCNC: 31.3 G/DL (ref 32–36)
MCV RBC AUTO: 91 FL (ref 82–98)
MONOCYTES # BLD AUTO: 0.4 K/UL (ref 0.3–1)
MONOCYTES NFR BLD: 6.8 % (ref 4–15)
NEUTROPHILS # BLD AUTO: 3.2 K/UL (ref 1.8–7.7)
NEUTROPHILS NFR BLD: 55.2 % (ref 38–73)
NONHDLC SERPL-MCNC: 126 MG/DL
NRBC BLD-RTO: 0 /100 WBC
PLATELET # BLD AUTO: 276 K/UL (ref 150–350)
PMV BLD AUTO: 10.4 FL (ref 9.2–12.9)
POTASSIUM SERPL-SCNC: 4.3 MMOL/L (ref 3.5–5.1)
PROT SERPL-MCNC: 6.6 G/DL (ref 6–8.4)
RBC # BLD AUTO: 4.83 M/UL (ref 4.6–6.2)
SODIUM SERPL-SCNC: 140 MMOL/L (ref 136–145)
TRIGL SERPL-MCNC: 75 MG/DL (ref 30–150)
WBC # BLD AUTO: 5.7 K/UL (ref 3.9–12.7)

## 2020-09-26 PROCEDURE — 83036 HEMOGLOBIN GLYCOSYLATED A1C: CPT

## 2020-09-26 PROCEDURE — 36415 COLL VENOUS BLD VENIPUNCTURE: CPT | Mod: PO

## 2020-09-26 PROCEDURE — 85025 COMPLETE CBC W/AUTO DIFF WBC: CPT

## 2020-09-26 PROCEDURE — 80061 LIPID PANEL: CPT

## 2020-09-26 PROCEDURE — 80053 COMPREHEN METABOLIC PANEL: CPT

## 2020-10-05 ENCOUNTER — OFFICE VISIT (OUTPATIENT)
Dept: FAMILY MEDICINE | Facility: CLINIC | Age: 60
End: 2020-10-05
Payer: COMMERCIAL

## 2020-10-05 VITALS
RESPIRATION RATE: 18 BRPM | WEIGHT: 201.81 LBS | HEART RATE: 75 BPM | DIASTOLIC BLOOD PRESSURE: 86 MMHG | BODY MASS INDEX: 31.67 KG/M2 | OXYGEN SATURATION: 95 % | HEIGHT: 67 IN | TEMPERATURE: 97 F | SYSTOLIC BLOOD PRESSURE: 132 MMHG

## 2020-10-05 DIAGNOSIS — L30.9 DERMATITIS: ICD-10-CM

## 2020-10-05 DIAGNOSIS — Z11.4 SCREENING FOR HIV (HUMAN IMMUNODEFICIENCY VIRUS): ICD-10-CM

## 2020-10-05 DIAGNOSIS — Z23 FLU VACCINE NEED: ICD-10-CM

## 2020-10-05 DIAGNOSIS — R73.03 PREDIABETES: Chronic | ICD-10-CM

## 2020-10-05 DIAGNOSIS — Z00.00 ROUTINE MEDICAL EXAM: Primary | ICD-10-CM

## 2020-10-05 DIAGNOSIS — F33.0 MILD RECURRENT MAJOR DEPRESSION: Chronic | ICD-10-CM

## 2020-10-05 DIAGNOSIS — I10 ESSENTIAL HYPERTENSION: Chronic | ICD-10-CM

## 2020-10-05 DIAGNOSIS — E66.9 OBESITY, CLASS I, BMI 30-34.9: Chronic | ICD-10-CM

## 2020-10-05 DIAGNOSIS — Z23 NEED FOR TDAP VACCINATION: ICD-10-CM

## 2020-10-05 PROBLEM — E66.811 OBESITY, CLASS I, BMI 30-34.9: Chronic | Status: ACTIVE | Noted: 2020-10-05

## 2020-10-05 PROCEDURE — 90471 FLU VACCINE (QUAD) GREATER THAN OR EQUAL TO 3YO PRESERVATIVE FREE IM: ICD-10-PCS | Mod: S$GLB,,, | Performed by: INTERNAL MEDICINE

## 2020-10-05 PROCEDURE — 99999 PR PBB SHADOW E&M-EST. PATIENT-LVL IV: CPT | Mod: PBBFAC,,, | Performed by: INTERNAL MEDICINE

## 2020-10-05 PROCEDURE — 90472 TDAP VACCINE GREATER THAN OR EQUAL TO 7YO IM: ICD-10-PCS | Mod: S$GLB,,, | Performed by: INTERNAL MEDICINE

## 2020-10-05 PROCEDURE — 3075F SYST BP GE 130 - 139MM HG: CPT | Mod: CPTII,S$GLB,, | Performed by: INTERNAL MEDICINE

## 2020-10-05 PROCEDURE — 3008F BODY MASS INDEX DOCD: CPT | Mod: CPTII,S$GLB,, | Performed by: INTERNAL MEDICINE

## 2020-10-05 PROCEDURE — 3079F DIAST BP 80-89 MM HG: CPT | Mod: CPTII,S$GLB,, | Performed by: INTERNAL MEDICINE

## 2020-10-05 PROCEDURE — 99396 PREV VISIT EST AGE 40-64: CPT | Mod: 25,S$GLB,, | Performed by: INTERNAL MEDICINE

## 2020-10-05 PROCEDURE — 99999 PR PBB SHADOW E&M-EST. PATIENT-LVL IV: ICD-10-PCS | Mod: PBBFAC,,, | Performed by: INTERNAL MEDICINE

## 2020-10-05 PROCEDURE — 99396 PR PREVENTIVE VISIT,EST,40-64: ICD-10-PCS | Mod: 25,S$GLB,, | Performed by: INTERNAL MEDICINE

## 2020-10-05 PROCEDURE — 90715 TDAP VACCINE 7 YRS/> IM: CPT | Mod: S$GLB,,, | Performed by: INTERNAL MEDICINE

## 2020-10-05 PROCEDURE — 90686 IIV4 VACC NO PRSV 0.5 ML IM: CPT | Mod: S$GLB,,, | Performed by: INTERNAL MEDICINE

## 2020-10-05 PROCEDURE — 3075F PR MOST RECENT SYSTOLIC BLOOD PRESS GE 130-139MM HG: ICD-10-PCS | Mod: CPTII,S$GLB,, | Performed by: INTERNAL MEDICINE

## 2020-10-05 PROCEDURE — 90471 IMMUNIZATION ADMIN: CPT | Mod: S$GLB,,, | Performed by: INTERNAL MEDICINE

## 2020-10-05 PROCEDURE — 90686 FLU VACCINE (QUAD) GREATER THAN OR EQUAL TO 3YO PRESERVATIVE FREE IM: ICD-10-PCS | Mod: S$GLB,,, | Performed by: INTERNAL MEDICINE

## 2020-10-05 PROCEDURE — 3008F PR BODY MASS INDEX (BMI) DOCUMENTED: ICD-10-PCS | Mod: CPTII,S$GLB,, | Performed by: INTERNAL MEDICINE

## 2020-10-05 PROCEDURE — 90715 TDAP VACCINE GREATER THAN OR EQUAL TO 7YO IM: ICD-10-PCS | Mod: S$GLB,,, | Performed by: INTERNAL MEDICINE

## 2020-10-05 PROCEDURE — 90472 IMMUNIZATION ADMIN EACH ADD: CPT | Mod: S$GLB,,, | Performed by: INTERNAL MEDICINE

## 2020-10-05 PROCEDURE — 3079F PR MOST RECENT DIASTOLIC BLOOD PRESSURE 80-89 MM HG: ICD-10-PCS | Mod: CPTII,S$GLB,, | Performed by: INTERNAL MEDICINE

## 2020-10-05 RX ORDER — METFORMIN HYDROCHLORIDE 500 MG/1
500 TABLET, EXTENDED RELEASE ORAL DAILY
Qty: 90 TABLET | Refills: 3 | Status: SHIPPED | OUTPATIENT
Start: 2020-10-05 | End: 2021-12-15

## 2020-10-05 RX ORDER — TRIAMCINOLONE ACETONIDE 1 MG/G
OINTMENT TOPICAL 2 TIMES DAILY
Qty: 30 G | Refills: 1 | Status: SHIPPED | OUTPATIENT
Start: 2020-10-05 | End: 2022-07-21

## 2020-10-05 RX ORDER — LOSARTAN POTASSIUM 50 MG/1
50 TABLET ORAL DAILY
Qty: 90 TABLET | Refills: 3 | Status: SHIPPED | OUTPATIENT
Start: 2020-10-05 | End: 2021-12-15

## 2020-10-05 RX ORDER — CITALOPRAM 40 MG/1
40 TABLET, FILM COATED ORAL DAILY
Qty: 90 TABLET | Refills: 3 | Status: SHIPPED | OUTPATIENT
Start: 2020-10-05 | End: 2021-12-15

## 2020-10-05 NOTE — PATIENT INSTRUCTIONS
Please contact your insurance company to find out about the cost of the Shingrix vaccine and if it is cheaper at the pharmacy or in the clinic.      I would think hard about stopping the pepsi.

## 2020-10-05 NOTE — PROGRESS NOTES
Assessment & Plan  Problem List Items Addressed This Visit        Psychiatric    Mild recurrent major depression (Chronic)    Current Assessment & Plan     The current medical regimen is effective;  continue present plan and medications.          Relevant Medications    citalopram (CELEXA) 40 MG tablet       Cardiac/Vascular    Essential hypertension (Chronic)    Current Assessment & Plan     The current medical regimen is effective;  continue present plan and medications.          Relevant Medications    losartan (COZAAR) 50 MG tablet       Endocrine    Prediabetes (Chronic)    Current Assessment & Plan     Improved.  Recommended cutting out pepsi to see if we can stop the metformin.          Relevant Medications    metFORMIN (GLUCOPHAGE-XR) 500 MG ER 24hr tablet    Other Relevant Orders    Hemoglobin A1C    Obesity, Class I, BMI 30-34.9 (Chronic)    Current Assessment & Plan     The patient's BMI has been recorded in the chart. The patient has been provided educational materials regarding the benefits of attaining and maintaining a normal weight. We will continue to address and follow this issue during follow up visits.             Other Visit Diagnoses     Routine medical exam    -  Primary  -    Discussed healthy diet, regular exercise, necessary labs, age appropriate cancer screening, and routine vaccinations.       Relevant Orders    CBC auto differential    Comprehensive metabolic panel    Lipid Panel    Dermatitis    -  Start steroid cream. I have discussed the common side effects of this medication and black box warnings (if applicable to this medication) with the patient and answered all of the questions they had at the time of this visit regarding this medication.     Relevant Medications    triamcinolone acetonide 0.1% (KENALOG) 0.1 % ointment    Flu vaccine need    -  vaccinate    Relevant Orders    Influenza - Quadrivalent (PF)    Need for Tdap vaccination    -  vaccinate    Relevant Orders    Tdap  "Vaccine    Screening for HIV (human immunodeficiency virus) -  Screen per CDC rec's for age next year       Relevant Orders    HIV 1/2 Ag/Ab (4th Gen)            Health Maintenance reviewed, as above.    Follow-up: Follow up in about 1 year (around 10/5/2021) for Routine Physical.    ______________________________________________________________________    Chief Complaint  Chief Complaint   Patient presents with    Annual Exam       HPI  Miquel Luciano is a 60 y.o. male with multiple medical diagnoses as listed in the medical history and problem list that presents for routine physical.  Pt is known to me with his last appointment 10/2019.  He had labs prior to this OV that showed reassuring CBC, CMP, Lipids, A1c.  A1c has trended down to 6%    No acute complaints.  Reports that wife is concerned that he "sleeps too much."  No snoring or wtinessed apneic episodes.  No daytime somnolence after full nights rests.  He wakes early for work, has physical job, takes a nap in the afternoons.       PAST MEDICAL HISTORY:  Past Medical History:   Diagnosis Date    Depression     Hypertension     Prediabetes        PAST SURGICAL HISTORY:  Past Surgical History:   Procedure Laterality Date    COLONOSCOPY N/A 4/8/2016    Procedure: COLONOSCOPY;  Surgeon: oFrtino Perez MD;  Location: [facility];  Service: Endoscopy;  Laterality: N/A;    EYE SURGERY      lens implant    FINGER AMPUTATION      NASAL SEPTUM SURGERY      VASECTOMY         SOCIAL HISTORY:  Social History     Socioeconomic History    Marital status:      Spouse name: Not on file    Number of children: Not on file    Years of education: Not on file    Highest education level: Not on file   Occupational History    Not on file   Social Needs    Financial resource strain: Not on file    Food insecurity     Worry: Not on file     Inability: Not on file    Transportation needs     Medical: Not on file     Non-medical: Not on file   Tobacco Use    " Smoking status: Former Smoker    Smokeless tobacco: Former User     Quit date: 6/12/2015   Substance and Sexual Activity    Alcohol use: Yes     Alcohol/week: 2.0 standard drinks     Types: 2 Cans of beer per week     Comment: a week     Drug use: Never    Sexual activity: Yes     Partners: Female   Lifestyle    Physical activity     Days per week: Not on file     Minutes per session: Not on file    Stress: Not on file   Relationships    Social connections     Talks on phone: Not on file     Gets together: Not on file     Attends Anabaptism service: Not on file     Active member of club or organization: Not on file     Attends meetings of clubs or organizations: Not on file     Relationship status: Not on file   Other Topics Concern    Not on file   Social History Narrative    Not on file       FAMILY HISTORY:  History reviewed. No pertinent family history.    ALLERGIES AND MEDICATIONS: updated and reviewed.  Review of patient's allergies indicates:   Allergen Reactions    Codeine Nausea Only     Current Outpatient Medications   Medication Sig Dispense Refill    citalopram (CELEXA) 40 MG tablet Take 1 tablet (40 mg total) by mouth once daily. 90 tablet 3    fish oil-omega-3 fatty acids 300-1,000 mg capsule Take 2 g by mouth once daily.      losartan (COZAAR) 50 MG tablet Take 1 tablet (50 mg total) by mouth once daily. 90 tablet 3    metFORMIN (GLUCOPHAGE-XR) 500 MG ER 24hr tablet Take 1 tablet (500 mg total) by mouth once daily. 90 tablet 3    ranitidine (ZANTAC) 75 MG tablet Take 75 mg by mouth 2 (two) times daily.      triamcinolone acetonide 0.1% (KENALOG) 0.1 % ointment Apply topically 2 (two) times daily. Apply to affected area.  Do not apply to face, armpits, or groin 30 g 1     No current facility-administered medications for this visit.          ROS  Review of Systems   Constitutional: Negative for chills, fever and unexpected weight change.   HENT: Negative for congestion, dental problem,  "ear pain, hearing loss, rhinorrhea, sore throat and trouble swallowing.    Eyes: Negative for discharge, redness and visual disturbance.   Respiratory: Negative for cough, chest tightness, shortness of breath and wheezing.    Cardiovascular: Negative for chest pain, palpitations and leg swelling.   Gastrointestinal: Negative for abdominal pain, constipation, diarrhea, nausea and vomiting.   Endocrine: Negative for polydipsia, polyphagia and polyuria.   Genitourinary: Negative for decreased urine volume, dysuria and hematuria.   Musculoskeletal: Negative for arthralgias and myalgias.   Skin: Positive for rash (lower legs, inside of sock line, itchy). Negative for color change.   Neurological: Negative for dizziness, weakness, light-headedness and headaches.   Psychiatric/Behavioral: Negative for decreased concentration, dysphoric mood, sleep disturbance and suicidal ideas.           Physical Exam  Vitals:    10/05/20 0719   BP: 132/86   Pulse: 75   Resp: 18   Temp: 97.1 °F (36.2 °C)   TempSrc: Temporal   SpO2: 95%   Weight: 91.6 kg (201 lb 13.3 oz)   Height: 5' 7" (1.702 m)    Body mass index is 31.61 kg/m².  Weight: 91.6 kg (201 lb 13.3 oz)   Height: 5' 7" (170.2 cm)   Physical Exam  Constitutional:       General: He is not in acute distress.     Appearance: He is well-developed.   HENT:      Head: Normocephalic and atraumatic.   Eyes:      General: Lids are normal. No scleral icterus.     Conjunctiva/sclera: Conjunctivae normal.      Pupils: Pupils are equal, round, and reactive to light.   Neck:      Musculoskeletal: Full passive range of motion without pain and neck supple.      Thyroid: No thyromegaly.      Vascular: No carotid bruit or JVD.   Cardiovascular:      Rate and Rhythm: Normal rate and regular rhythm.      Heart sounds: Normal heart sounds. No murmur. No friction rub. No S3 or S4 sounds.    Pulmonary:      Effort: Pulmonary effort is normal.      Breath sounds: Normal breath sounds. No wheezing, " rhonchi or rales.   Abdominal:      General: Bowel sounds are normal. There is no distension.      Palpations: Abdomen is soft.      Tenderness: There is no abdominal tenderness.   Musculoskeletal:         General: No tenderness.      Right lower leg: No edema.      Left lower leg: No edema.   Skin:     General: Skin is warm and dry.      Findings: Rash (dermatitis of BLE) present.   Neurological:      Mental Status: He is alert and oriented to person, place, and time.   Psychiatric:         Speech: Speech normal.         Behavior: Behavior normal.         Thought Content: Thought content normal.           Health Maintenance       Date Due Completion Date    HIV Screening 10[birth year]01/1976 ---    Shingles Vaccine (1 of 2) 10/01/2011 ---    TETANUS VACCINE 10/16/2019 10/16/2009 (Done)    Override on 10/16/2009: Done    Influenza Vaccine (1) 08/01/2020 10/12/2019    Lipid Panel 09/26/2025 9/26/2020    Colorectal Cancer Screening 04/08/2026 4/8/2016

## 2020-10-05 NOTE — ASSESSMENT & PLAN NOTE
The patient's BMI has been recorded in the chart. The patient has been provided educational materials regarding the benefits of attaining and maintaining a normal weight. We will continue to address and follow this issue during follow up visits.

## 2021-12-15 DIAGNOSIS — R73.03 PREDIABETES: Chronic | ICD-10-CM

## 2021-12-15 DIAGNOSIS — F33.0 MILD RECURRENT MAJOR DEPRESSION: Chronic | ICD-10-CM

## 2021-12-15 DIAGNOSIS — I10 ESSENTIAL HYPERTENSION: Chronic | ICD-10-CM

## 2021-12-15 RX ORDER — CITALOPRAM 40 MG/1
TABLET, FILM COATED ORAL
Qty: 90 TABLET | Refills: 0 | Status: SHIPPED | OUTPATIENT
Start: 2021-12-15 | End: 2022-11-14

## 2021-12-15 RX ORDER — LOSARTAN POTASSIUM 50 MG/1
TABLET ORAL
Qty: 90 TABLET | Refills: 0 | Status: SHIPPED | OUTPATIENT
Start: 2021-12-15 | End: 2022-11-14

## 2021-12-15 RX ORDER — METFORMIN HYDROCHLORIDE 500 MG/1
TABLET, EXTENDED RELEASE ORAL
Qty: 90 TABLET | Refills: 0 | Status: SHIPPED | OUTPATIENT
Start: 2021-12-15 | End: 2022-11-14

## 2022-03-13 DIAGNOSIS — R73.03 PREDIABETES: Chronic | ICD-10-CM

## 2022-03-13 DIAGNOSIS — I10 ESSENTIAL HYPERTENSION: Chronic | ICD-10-CM

## 2022-03-13 DIAGNOSIS — F33.0 MILD RECURRENT MAJOR DEPRESSION: Chronic | ICD-10-CM

## 2022-03-13 NOTE — TELEPHONE ENCOUNTER
Care Due:                  Date            Visit Type   Department     Provider  --------------------------------------------------------------------------------                                [facility] FAM[facility]                              PRIMARY      MED/ INTERNAL  Last Visit: 10-      CARE ([facility])   MED/ PEDS      Joel Kc  Next Visit: None Scheduled  None         None Found                                                            Last  Test          Frequency    Reason                     Performed    Due Date  --------------------------------------------------------------------------------    Office Visit  12 months..  citalopram, losartan,      10-   09-                             metFORMIN................    CMP.........  12 months..  losartan, metFORMIN......  09- 09-    HBA1C.......  6 months...  metFORMIN................  09- 03-    Powered by [employer]. Reference number: [id number].   3/13/2022 7:29:40 AM CDT

## 2022-03-16 DIAGNOSIS — I10 ESSENTIAL HYPERTENSION: ICD-10-CM

## 2022-03-17 RX ORDER — METFORMIN HYDROCHLORIDE 500 MG/1
TABLET, EXTENDED RELEASE ORAL
Qty: 90 TABLET | Refills: 0 | OUTPATIENT
Start: 2022-03-17

## 2022-03-17 RX ORDER — CITALOPRAM 40 MG/1
TABLET, FILM COATED ORAL
Qty: 90 TABLET | Refills: 0 | OUTPATIENT
Start: 2022-03-17

## 2022-03-17 RX ORDER — LOSARTAN POTASSIUM 50 MG/1
TABLET ORAL
Qty: 90 TABLET | Refills: 0 | OUTPATIENT
Start: 2022-03-17

## 2022-03-17 NOTE — TELEPHONE ENCOUNTER
This Rx Request does not qualify for assessment with the [facility]   Please review protocol details and the Care Due Message for extra clinical information    Reasons Rx Request may be deferred:  Labs/Vitals overdue  Pt due for OV with PCP    Note composed:1:06 PM 03/17/2022

## 2022-03-17 NOTE — TELEPHONE ENCOUNTER
Provider Staff:     Action required for this patient.    Please note Refusal of medication.            Requested Prescriptions     Refused Prescriptions Disp Refills    metFORMIN (GLUCOPHAGE-XR) 500 MG ER 24hr tablet [Pharmacy Med Name: METFORMIN HCL  MG TABLET] 90 tablet 0     Sig: TAKE 1 TABLET BY MOUTH EVERY DAY     Refused By: FABIAN BEJARANO     Reason for Refusal: Patient needs an appointment    losartan (COZAAR) 50 MG tablet [Pharmacy Med Name: LOSARTAN POTASSIUM 50 MG TAB] 90 tablet 0     Sig: TAKE 1 TABLET BY MOUTH EVERY DAY     Refused By: FABIAN BEJARANO     Reason for Refusal: Patient needs an appointment    citalopram (CELEXA) 40 MG tablet [Pharmacy Med Name: CITALOPRAM HBR 40 MG TABLET] 90 tablet 0     Sig: TAKE 1 TABLET BY MOUTH EVERY DAY     Refused By: FABIAN BEJARANO     Reason for Refusal: Patient needs an appointment      Thanks!  [facility]   Note composed: 03/17/2022 2:40 PM

## 2022-07-21 ENCOUNTER — OFFICE VISIT (OUTPATIENT)
Dept: FAMILY MEDICINE | Facility: CLINIC | Age: 62
End: 2022-07-21
Payer: COMMERCIAL

## 2022-07-21 VITALS
SYSTOLIC BLOOD PRESSURE: 130 MMHG | DIASTOLIC BLOOD PRESSURE: 70 MMHG | HEART RATE: 76 BPM | WEIGHT: 199.44 LBS | TEMPERATURE: 98 F | OXYGEN SATURATION: 95 % | HEIGHT: 68 IN | BODY MASS INDEX: 30.22 KG/M2

## 2022-07-21 DIAGNOSIS — Z00.00 ROUTINE MEDICAL EXAM: Primary | ICD-10-CM

## 2022-07-21 DIAGNOSIS — F33.0 MILD RECURRENT MAJOR DEPRESSION: ICD-10-CM

## 2022-07-21 DIAGNOSIS — Z87.891 FORMER SMOKER: ICD-10-CM

## 2022-07-21 DIAGNOSIS — L81.8 DISORDER OF SKIN DUE TO TATTOO INK: ICD-10-CM

## 2022-07-21 DIAGNOSIS — L98.9 DISORDER OF SKIN DUE TO TATTOO INK: ICD-10-CM

## 2022-07-21 DIAGNOSIS — E66.9 OBESITY, CLASS I, BMI 30-34.9: Chronic | ICD-10-CM

## 2022-07-21 DIAGNOSIS — L30.9 DERMATITIS: ICD-10-CM

## 2022-07-21 DIAGNOSIS — I10 ESSENTIAL HYPERTENSION: Chronic | ICD-10-CM

## 2022-07-21 DIAGNOSIS — R73.03 PREDIABETES: Chronic | ICD-10-CM

## 2022-07-21 DIAGNOSIS — Z11.4 SCREENING FOR HIV (HUMAN IMMUNODEFICIENCY VIRUS): ICD-10-CM

## 2022-07-21 PROCEDURE — 99999 PR PBB SHADOW E&M-EST. PATIENT-LVL IV: ICD-10-PCS | Mod: PBBFAC,,, | Performed by: INTERNAL MEDICINE

## 2022-07-21 PROCEDURE — 3078F DIAST BP <80 MM HG: CPT | Mod: CPTII,S$GLB,, | Performed by: INTERNAL MEDICINE

## 2022-07-21 PROCEDURE — 99396 PR PREVENTIVE VISIT,EST,40-64: ICD-10-PCS | Mod: S$GLB,,, | Performed by: INTERNAL MEDICINE

## 2022-07-21 PROCEDURE — 1159F MED LIST DOCD IN RCRD: CPT | Mod: CPTII,S$GLB,, | Performed by: INTERNAL MEDICINE

## 2022-07-21 PROCEDURE — 3075F PR MOST RECENT SYSTOLIC BLOOD PRESS GE 130-139MM HG: ICD-10-PCS | Mod: CPTII,S$GLB,, | Performed by: INTERNAL MEDICINE

## 2022-07-21 PROCEDURE — 99396 PREV VISIT EST AGE 40-64: CPT | Mod: S$GLB,,, | Performed by: INTERNAL MEDICINE

## 2022-07-21 PROCEDURE — 3075F SYST BP GE 130 - 139MM HG: CPT | Mod: CPTII,S$GLB,, | Performed by: INTERNAL MEDICINE

## 2022-07-21 PROCEDURE — 3008F PR BODY MASS INDEX (BMI) DOCUMENTED: ICD-10-PCS | Mod: CPTII,S$GLB,, | Performed by: INTERNAL MEDICINE

## 2022-07-21 PROCEDURE — 3008F BODY MASS INDEX DOCD: CPT | Mod: CPTII,S$GLB,, | Performed by: INTERNAL MEDICINE

## 2022-07-21 PROCEDURE — 3078F PR MOST RECENT DIASTOLIC BLOOD PRESSURE < 80 MM HG: ICD-10-PCS | Mod: CPTII,S$GLB,, | Performed by: INTERNAL MEDICINE

## 2022-07-21 PROCEDURE — 1159F PR MEDICATION LIST DOCUMENTED IN MEDICAL RECORD: ICD-10-PCS | Mod: CPTII,S$GLB,, | Performed by: INTERNAL MEDICINE

## 2022-07-21 PROCEDURE — 99999 PR PBB SHADOW E&M-EST. PATIENT-LVL IV: CPT | Mod: PBBFAC,,, | Performed by: INTERNAL MEDICINE

## 2022-07-21 PROCEDURE — 1160F PR REVIEW ALL MEDS BY PRESCRIBER/CLIN PHARMACIST DOCUMENTED: ICD-10-PCS | Mod: CPTII,S$GLB,, | Performed by: INTERNAL MEDICINE

## 2022-07-21 PROCEDURE — 1160F RVW MEDS BY RX/DR IN RCRD: CPT | Mod: CPTII,S$GLB,, | Performed by: INTERNAL MEDICINE

## 2022-07-21 RX ORDER — TRIAMCINOLONE ACETONIDE 5 MG/G
OINTMENT TOPICAL 2 TIMES DAILY PRN
Qty: 15 G | Refills: 2 | Status: SHIPPED | OUTPATIENT
Start: 2022-07-21

## 2022-07-21 NOTE — PROGRESS NOTES
Assessment & Plan  Problem List Items Addressed This Visit        Psychiatric    Mild recurrent major depression (Chronic)    Current Assessment & Plan     The current medical regimen is effective;  continue present plan and medications.               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 Started 10 years after tattoo but clearly delineated within the tattoo margin           Current Assessment & Plan     Increase topical steroid potency.  I have discussed the common side effects of this(these) medication(s) and black box warnings (if applicable) with the patient and answered all of the questions they had at the time of this visit regarding this(these) medication(s).     Next step, derm referral           Relevant Medications    triamcinolone (KENALOG) 0.5 % ointment       Cardiac/Vascular    Essential hypertension (Chronic)    Current Assessment & Plan     The current medical regimen is effective;  continue present plan and medications.               Endocrine    Prediabetes (Chronic)    Current Assessment & Plan     Recheck labs           Relevant Orders    Hemoglobin A1C    Obesity, Class I, BMI 30-34.9 (Chronic)    Current Assessment & Plan     The patient's BMI has been recorded in the chart. The patient has been provided educational materials regarding the benefits of attaining and maintaining a normal weight. We will continue to address and follow this issue during follow up visits.               Other Visit Diagnoses     Routine medical exam    -  Primary  -    Discussed healthy diet, regular exercise, necessary labs, age appropriate cancer screening, and routine vaccinations.       Relevant Orders    CBC Auto Differential    Comprehensive Metabolic Panel    Lipid Panel    Hemoglobin A1C    Screening for HIV (human immunodeficiency virus)    -  Routine screen for low risk individual.      Relevant Orders    HIV 1/2 Ag/Ab (4th Gen)    Former smoker    -  Needs LDCT.       Relevant Orders    CT Chest Lung Screening Low Dose            Health Maintenance reviewed, as above.  Discussed COVID boost.    Follow-up: Follow up in about 1 year (around 2023), or if symptoms worsen or fail to improve, for Routine Physical.  ______________________________________________________________________    Chief Complaint  Chief Complaint   Patient presents with    Annual Exam       HPI  Miquel Luciano is a 61 y.o. male with medical diagnoses as listed in the medical history and problem list that presents for routine physical.  Pt is known to me with their last appointment 10/2020.  He is due for labs    No acute complaints.  Still with dermatitis on leg around tattoo (10 years after tattoo).       PAST MEDICAL HISTORY:  Past Medical History:   Diagnosis Date    Depression     Hypertension     Prediabetes        PAST SURGICAL HISTORY:  Past Surgical History:   Procedure Laterality Date    COLONOSCOPY N/A 2016    Procedure: COLONOSCOPY;  Surgeon: Fortino Perez MD;  Location: [facility];  Service: Endoscopy;  Laterality: N/A;    EYE SURGERY      lens implant    FINGER AMPUTATION      NASAL SEPTUM SURGERY      VASECTOMY         SOCIAL HISTORY:  Social History     Socioeconomic History    Marital status:    Tobacco Use    Smoking status: Former Smoker     Packs/day: 1.50     Years: 35.00     Pack years: 52.50     Quit date: 2014     Years since quittin.0    Smokeless tobacco: Former User     Quit date: 2015   Substance and Sexual Activity    Alcohol use: Yes     Alcohol/week: 2.0 standard drinks     Types: 2 Cans of beer per week     Comment: a week     Drug use: Never    Sexual activity: Yes     Partners: Female       FAMILY HISTORY:  History reviewed. No pertinent family history.    ALLERGIES AND MEDICATIONS: updated and reviewed.  Review of patient's allergies indicates:   Allergen Reactions    Codeine Nausea Only     Current Outpatient Medications  "  Medication Sig Dispense Refill    citalopram (CELEXA) 40 MG tablet TAKE 1 TABLET BY MOUTH EVERY DAY 90 tablet 0    fish oil-omega-3 fatty acids 300-1,000 mg capsule Take 2 g by mouth once daily.      losartan (COZAAR) 50 MG tablet TAKE 1 TABLET BY MOUTH EVERY DAY 90 tablet 0    metFORMIN (GLUCOPHAGE-XR) 500 MG ER 24hr tablet TAKE 1 TABLET BY MOUTH EVERY DAY 90 tablet 0    ranitidine (ZANTAC) 75 MG tablet Take 75 mg by mouth 2 (two) times daily.      triamcinolone (KENALOG) 0.5 % ointment Apply topically 2 (two) times daily as needed (rash on leg). Do not apply to face, armpits, or groin 15 g 2     No current facility-administered medications for this visit.         ROS  Review of Systems   Constitutional: Negative for chills, fever and unexpected weight change.   HENT: Negative for congestion, dental problem, ear pain, hearing loss, rhinorrhea, sore throat and trouble swallowing.    Eyes: Negative for discharge, redness and visual disturbance.   Respiratory: Negative for cough, chest tightness, shortness of breath and wheezing.    Cardiovascular: Negative for chest pain, palpitations and leg swelling.   Gastrointestinal: Negative for abdominal pain, constipation, diarrhea, nausea and vomiting.   Endocrine: Negative for polydipsia, polyphagia and polyuria.   Genitourinary: Negative for decreased urine volume, dysuria and hematuria.   Musculoskeletal: Negative for arthralgias and myalgias.   Skin: Positive for rash. Negative for color change.   Neurological: Negative for dizziness, weakness, light-headedness and headaches.   Psychiatric/Behavioral: Negative for decreased concentration, dysphoric mood, sleep disturbance and suicidal ideas.           Physical Exam  Vitals:    07/21/22 1429   BP: 130/70   Pulse: 76   Temp: 97.7 °F (36.5 °C)   SpO2: 95%   Weight: 90.5 kg (199 lb 6.5 oz)   Height: 5' 8" (1.727 m)    Body mass index is 30.32 kg/m².  Weight: 90.5 kg (199 lb 6.5 oz)   Height: 5' 8" (172.7 cm) "   Physical Exam  Constitutional:       General: He is not in acute distress.     Appearance: He is well-developed.   HENT:      Head: Normocephalic and atraumatic.   Eyes:      General: Lids are normal. No scleral icterus.     Conjunctiva/sclera: Conjunctivae normal.      Pupils: Pupils are equal, round, and reactive to light.   Neck:      Thyroid: No thyromegaly.      Vascular: No carotid bruit or JVD.   Cardiovascular:      Rate and Rhythm: Normal rate and regular rhythm.      Heart sounds: Normal heart sounds. No murmur heard.    No friction rub. No S3 or S4 sounds.   Pulmonary:      Effort: Pulmonary effort is normal.      Breath sounds: Normal breath sounds. No wheezing, rhonchi or rales.   Abdominal:      General: Bowel sounds are normal. There is no distension.      Palpations: Abdomen is soft.      Tenderness: There is no abdominal tenderness.   Musculoskeletal:         General: No tenderness.      Cervical back: Full passive range of motion without pain and neck supple.      Right lower leg: No edema.      Left lower leg: No edema.   Skin:     General: Skin is warm and dry.      Findings: Rash present.   Neurological:      Mental Status: He is alert and oriented to person, place, and time.   Psychiatric:         Speech: Speech normal.         Behavior: Behavior normal.         Thought Content: Thought content normal.             Health Maintenance       Date Due Completion Date    HIV Screening Never done ---    Shingles Vaccine (1 of 2) Never done ---    COVID-19 Vaccine (4 - Booster for Pfizer series) 05/30/2022 1/30/2022    Influenza Vaccine (1) 09/01/2022 10/5/2020    Lipid Panel 09/26/2025 9/26/2020    Colorectal Cancer Screening 04/08/2026 4/8/2016    TETANUS VACCINE 10/05/2030 10/5/2020    Override on 10/16/2009: Done

## 2022-07-21 NOTE — ASSESSMENT & PLAN NOTE
Increase topical steroid potency.  I have discussed the common side effects of this(these) medication(s) and black box warnings (if applicable) with the patient and answered all of the questions they had at the time of this visit regarding this(these) medication(s).     Next step, derm referral

## 2022-07-22 ENCOUNTER — LAB VISIT (OUTPATIENT)
Dept: LAB | Facility: HOSPITAL | Age: 62
End: 2022-07-22
Attending: INTERNAL MEDICINE
Payer: COMMERCIAL

## 2022-07-22 DIAGNOSIS — Z11.4 SCREENING FOR HIV (HUMAN IMMUNODEFICIENCY VIRUS): ICD-10-CM

## 2022-07-22 DIAGNOSIS — Z00.00 ROUTINE MEDICAL EXAM: ICD-10-CM

## 2022-07-22 DIAGNOSIS — R73.03 PREDIABETES: Chronic | ICD-10-CM

## 2022-07-22 LAB
ALBUMIN SERPL BCP-MCNC: 4.1 G/DL (ref 3.5–5.2)
ALP SERPL-CCNC: 77 U/L (ref 55–135)
ALT SERPL W/O P-5'-P-CCNC: 17 U/L (ref 10–44)
ANION GAP SERPL CALC-SCNC: 9 MMOL/L (ref 8–16)
AST SERPL-CCNC: 16 U/L (ref 10–40)
BASOPHILS # BLD AUTO: 0.04 K/UL (ref 0–0.2)
BASOPHILS NFR BLD: 0.8 % (ref 0–1.9)
BILIRUB SERPL-MCNC: 0.6 MG/DL (ref 0.1–1)
BUN SERPL-MCNC: 19 MG/DL (ref 6–20)
CALCIUM SERPL-MCNC: 9.4 MG/DL (ref 8.7–10.5)
CHLORIDE SERPL-SCNC: 106 MMOL/L (ref 95–110)
CHOLEST SERPL-MCNC: 192 MG/DL (ref 120–199)
CHOLEST/HDLC SERPL: 4.7 {RATIO} (ref 2–5)
CO2 SERPL-SCNC: 25 MMOL/L (ref 23–29)
CREAT SERPL-MCNC: 1 MG/DL (ref 0.5–1.4)
DIFFERENTIAL METHOD: ABNORMAL
EOSINOPHIL # BLD AUTO: 0.1 K/UL (ref 0–0.5)
EOSINOPHIL NFR BLD: 2.1 % (ref 0–8)
ERYTHROCYTE [DISTWIDTH] IN BLOOD BY AUTOMATED COUNT: 13.5 % (ref 11.5–14.5)
EST. GFR  (AFRICAN AMERICAN): >60 ML/MIN/1.73 M^2
EST. GFR  (NON AFRICAN AMERICAN): >60 ML/MIN/1.73 M^2
ESTIMATED AVG GLUCOSE: 134 MG/DL (ref 68–131)
GLUCOSE SERPL-MCNC: 112 MG/DL (ref 70–110)
HBA1C MFR BLD: 6.3 % (ref 4–5.6)
HCT VFR BLD AUTO: 40.2 % (ref 40–54)
HDLC SERPL-MCNC: 41 MG/DL (ref 40–75)
HDLC SERPL: 21.4 % (ref 20–50)
HGB BLD-MCNC: 13.3 G/DL (ref 14–18)
IMM GRANULOCYTES # BLD AUTO: 0.01 K/UL (ref 0–0.04)
IMM GRANULOCYTES NFR BLD AUTO: 0.2 % (ref 0–0.5)
LDLC SERPL CALC-MCNC: 133.2 MG/DL (ref 63–159)
LYMPHOCYTES # BLD AUTO: 1.6 K/UL (ref 1–4.8)
LYMPHOCYTES NFR BLD: 32.6 % (ref 18–48)
MCH RBC QN AUTO: 28.5 PG (ref 27–31)
MCHC RBC AUTO-ENTMCNC: 33.1 G/DL (ref 32–36)
MCV RBC AUTO: 86 FL (ref 82–98)
MONOCYTES # BLD AUTO: 0.3 K/UL (ref 0.3–1)
MONOCYTES NFR BLD: 6.1 % (ref 4–15)
NEUTROPHILS # BLD AUTO: 2.8 K/UL (ref 1.8–7.7)
NEUTROPHILS NFR BLD: 58.2 % (ref 38–73)
NONHDLC SERPL-MCNC: 151 MG/DL
NRBC BLD-RTO: 0 /100 WBC
PLATELET # BLD AUTO: 261 K/UL (ref 150–450)
PMV BLD AUTO: 10.5 FL (ref 9.2–12.9)
POTASSIUM SERPL-SCNC: 4.2 MMOL/L (ref 3.5–5.1)
PROT SERPL-MCNC: 7.1 G/DL (ref 6–8.4)
RBC # BLD AUTO: 4.67 M/UL (ref 4.6–6.2)
SODIUM SERPL-SCNC: 140 MMOL/L (ref 136–145)
TRIGL SERPL-MCNC: 89 MG/DL (ref 30–150)
WBC # BLD AUTO: 4.78 K/UL (ref 3.9–12.7)

## 2022-07-22 PROCEDURE — 80061 LIPID PANEL: CPT | Performed by: INTERNAL MEDICINE

## 2022-07-22 PROCEDURE — 80053 COMPREHEN METABOLIC PANEL: CPT | Performed by: INTERNAL MEDICINE

## 2022-07-22 PROCEDURE — 87389 HIV-1 AG W/HIV-1&-2 AB AG IA: CPT | Performed by: INTERNAL MEDICINE

## 2022-07-22 PROCEDURE — 36415 COLL VENOUS BLD VENIPUNCTURE: CPT | Mod: PO | Performed by: INTERNAL MEDICINE

## 2022-07-22 PROCEDURE — 83036 HEMOGLOBIN GLYCOSYLATED A1C: CPT | Performed by: INTERNAL MEDICINE

## 2022-07-22 PROCEDURE — 85025 COMPLETE CBC W/AUTO DIFF WBC: CPT | Performed by: INTERNAL MEDICINE

## 2022-07-26 LAB — HIV 1+2 AB+HIV1 P24 AG SERPL QL IA: NEGATIVE

## 2022-07-27 ENCOUNTER — TELEPHONE (OUTPATIENT)
Dept: FAMILY MEDICINE | Facility: CLINIC | Age: 62
End: 2022-07-27
Payer: COMMERCIAL

## 2022-07-27 DIAGNOSIS — R73.03 PREDIABETES: Chronic | ICD-10-CM

## 2022-07-27 NOTE — TELEPHONE ENCOUNTER
Please inform the patient of the following:    Your lab results are generally looking ok.  Your A1c has increased and is nearly at a diabetic level.  It is critical to stay on the metformin and reduce the sweets and simple carbs in the diet.  The cholesterol is up at well. I would recommend starting a cholesterol medication as your risk of heart attack and stroke within the next 10 years is at a 1 in 10 chance.      Please advise if he is ok with starting this.      I would recommend a follow up with Glory in 6 months with labs prior.     Thank you,  Pepe

## 2023-01-20 ENCOUNTER — LAB VISIT (OUTPATIENT)
Dept: LAB | Facility: HOSPITAL | Age: 63
End: 2023-01-20
Attending: INTERNAL MEDICINE
Payer: COMMERCIAL

## 2023-01-20 DIAGNOSIS — R73.03 PREDIABETES: Chronic | ICD-10-CM

## 2023-01-20 LAB
ALBUMIN SERPL BCP-MCNC: 3.9 G/DL (ref 3.5–5.2)
ALP SERPL-CCNC: 81 U/L (ref 55–135)
ALT SERPL W/O P-5'-P-CCNC: 19 U/L (ref 10–44)
ANION GAP SERPL CALC-SCNC: 6 MMOL/L (ref 8–16)
AST SERPL-CCNC: 18 U/L (ref 10–40)
BILIRUB SERPL-MCNC: 0.4 MG/DL (ref 0.1–1)
BUN SERPL-MCNC: 19 MG/DL (ref 8–23)
CALCIUM SERPL-MCNC: 9.4 MG/DL (ref 8.7–10.5)
CHLORIDE SERPL-SCNC: 103 MMOL/L (ref 95–110)
CO2 SERPL-SCNC: 28 MMOL/L (ref 23–29)
CREAT SERPL-MCNC: 1.1 MG/DL (ref 0.5–1.4)
EST. GFR  (NO RACE VARIABLE): >60 ML/MIN/1.73 M^2
ESTIMATED AVG GLUCOSE: 134 MG/DL (ref 68–131)
GLUCOSE SERPL-MCNC: 127 MG/DL (ref 70–110)
HBA1C MFR BLD: 6.3 % (ref 4–5.6)
POTASSIUM SERPL-SCNC: 4.3 MMOL/L (ref 3.5–5.1)
PROT SERPL-MCNC: 7 G/DL (ref 6–8.4)
SODIUM SERPL-SCNC: 137 MMOL/L (ref 136–145)

## 2023-01-20 PROCEDURE — 36415 COLL VENOUS BLD VENIPUNCTURE: CPT | Mod: PO | Performed by: INTERNAL MEDICINE

## 2023-01-20 PROCEDURE — 80053 COMPREHEN METABOLIC PANEL: CPT | Performed by: INTERNAL MEDICINE

## 2023-01-20 PROCEDURE — 83036 HEMOGLOBIN GLYCOSYLATED A1C: CPT | Performed by: INTERNAL MEDICINE

## 2023-01-31 ENCOUNTER — OFFICE VISIT (OUTPATIENT)
Dept: FAMILY MEDICINE | Facility: CLINIC | Age: 63
End: 2023-01-31
Payer: COMMERCIAL

## 2023-01-31 VITALS
WEIGHT: 202.38 LBS | TEMPERATURE: 98 F | BODY MASS INDEX: 30.67 KG/M2 | OXYGEN SATURATION: 95 % | HEIGHT: 68 IN | DIASTOLIC BLOOD PRESSURE: 70 MMHG | HEART RATE: 85 BPM | SYSTOLIC BLOOD PRESSURE: 122 MMHG

## 2023-01-31 DIAGNOSIS — L81.8 DISORDER OF SKIN DUE TO TATTOO INK: Chronic | ICD-10-CM

## 2023-01-31 DIAGNOSIS — I10 ESSENTIAL HYPERTENSION: Primary | Chronic | ICD-10-CM

## 2023-01-31 DIAGNOSIS — E78.49 OTHER HYPERLIPIDEMIA: Chronic | ICD-10-CM

## 2023-01-31 DIAGNOSIS — L98.9 DISORDER OF SKIN DUE TO TATTOO INK: Chronic | ICD-10-CM

## 2023-01-31 DIAGNOSIS — F33.0 MILD RECURRENT MAJOR DEPRESSION: Chronic | ICD-10-CM

## 2023-01-31 DIAGNOSIS — R73.03 PREDIABETES: Chronic | ICD-10-CM

## 2023-01-31 PROCEDURE — 3078F PR MOST RECENT DIASTOLIC BLOOD PRESSURE < 80 MM HG: ICD-10-PCS | Mod: CPTII,S$GLB,, | Performed by: INTERNAL MEDICINE

## 2023-01-31 PROCEDURE — 3044F HG A1C LEVEL LT 7.0%: CPT | Mod: CPTII,S$GLB,, | Performed by: INTERNAL MEDICINE

## 2023-01-31 PROCEDURE — 99214 PR OFFICE/OUTPT VISIT, EST, LEVL IV, 30-39 MIN: ICD-10-PCS | Mod: S$GLB,,, | Performed by: INTERNAL MEDICINE

## 2023-01-31 PROCEDURE — 3074F SYST BP LT 130 MM HG: CPT | Mod: CPTII,S$GLB,, | Performed by: INTERNAL MEDICINE

## 2023-01-31 PROCEDURE — 3078F DIAST BP <80 MM HG: CPT | Mod: CPTII,S$GLB,, | Performed by: INTERNAL MEDICINE

## 2023-01-31 PROCEDURE — 99214 OFFICE O/P EST MOD 30 MIN: CPT | Mod: S$GLB,,, | Performed by: INTERNAL MEDICINE

## 2023-01-31 PROCEDURE — 99999 PR PBB SHADOW E&M-EST. PATIENT-LVL V: CPT | Mod: PBBFAC,,, | Performed by: INTERNAL MEDICINE

## 2023-01-31 PROCEDURE — 4010F PR ACE/ARB THEARPY RXD/TAKEN: ICD-10-PCS | Mod: CPTII,S$GLB,, | Performed by: INTERNAL MEDICINE

## 2023-01-31 PROCEDURE — 3074F PR MOST RECENT SYSTOLIC BLOOD PRESSURE < 130 MM HG: ICD-10-PCS | Mod: CPTII,S$GLB,, | Performed by: INTERNAL MEDICINE

## 2023-01-31 PROCEDURE — 1159F PR MEDICATION LIST DOCUMENTED IN MEDICAL RECORD: ICD-10-PCS | Mod: CPTII,S$GLB,, | Performed by: INTERNAL MEDICINE

## 2023-01-31 PROCEDURE — 1159F MED LIST DOCD IN RCRD: CPT | Mod: CPTII,S$GLB,, | Performed by: INTERNAL MEDICINE

## 2023-01-31 PROCEDURE — 4010F ACE/ARB THERAPY RXD/TAKEN: CPT | Mod: CPTII,S$GLB,, | Performed by: INTERNAL MEDICINE

## 2023-01-31 PROCEDURE — 1160F RVW MEDS BY RX/DR IN RCRD: CPT | Mod: CPTII,S$GLB,, | Performed by: INTERNAL MEDICINE

## 2023-01-31 PROCEDURE — 3008F PR BODY MASS INDEX (BMI) DOCUMENTED: ICD-10-PCS | Mod: CPTII,S$GLB,, | Performed by: INTERNAL MEDICINE

## 2023-01-31 PROCEDURE — 99999 PR PBB SHADOW E&M-EST. PATIENT-LVL V: ICD-10-PCS | Mod: PBBFAC,,, | Performed by: INTERNAL MEDICINE

## 2023-01-31 PROCEDURE — 3044F PR MOST RECENT HEMOGLOBIN A1C LEVEL <7.0%: ICD-10-PCS | Mod: CPTII,S$GLB,, | Performed by: INTERNAL MEDICINE

## 2023-01-31 PROCEDURE — 3008F BODY MASS INDEX DOCD: CPT | Mod: CPTII,S$GLB,, | Performed by: INTERNAL MEDICINE

## 2023-01-31 PROCEDURE — 1160F PR REVIEW ALL MEDS BY PRESCRIBER/CLIN PHARMACIST DOCUMENTED: ICD-10-PCS | Mod: CPTII,S$GLB,, | Performed by: INTERNAL MEDICINE

## 2023-01-31 RX ORDER — LOSARTAN POTASSIUM 50 MG/1
50 TABLET ORAL DAILY
Qty: 90 TABLET | Refills: 3 | Status: SHIPPED | OUTPATIENT
Start: 2023-01-31

## 2023-01-31 RX ORDER — CITALOPRAM 40 MG/1
TABLET, FILM COATED ORAL
Qty: 90 TABLET | Refills: 3 | Status: SHIPPED | OUTPATIENT
Start: 2023-01-31

## 2023-01-31 RX ORDER — METFORMIN HYDROCHLORIDE 500 MG/1
500 TABLET, EXTENDED RELEASE ORAL DAILY
Qty: 90 TABLET | Refills: 3 | Status: SHIPPED | OUTPATIENT
Start: 2023-01-31

## 2023-01-31 RX ORDER — PRAVASTATIN SODIUM 20 MG/1
20 TABLET ORAL DAILY
Qty: 90 TABLET | Refills: 3 | Status: SHIPPED | OUTPATIENT
Start: 2023-01-31

## 2023-01-31 NOTE — PROGRESS NOTES
Assessment & Plan  Problem List Items Addressed This Visit          Psychiatric    Mild recurrent major depression (Chronic)    Current Assessment & Plan     The current medical regimen is effective;  continue present plan and medications.          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 Started 10 years after tattoo but clearly delineated within the tattoo margin         Current Assessment & Plan     Requesting to see dermatology.  Will place referral today.          Relevant Orders    Ambulatory referral/consult to Dermatology       Cardiac/Vascular    Essential hypertension - Primary (Chronic)    Current Assessment & Plan     The current medical regimen is effective;  continue present plan and medications.          Relevant Medications    losartan (COZAAR) 50 MG tablet    Other Relevant Orders    CBC Auto Differential    Lipid Panel    Basic Metabolic Panel    Other hyperlipidemia (Chronic)    Overview     CVD risk score 11.1%         Current Assessment & Plan     Start statin for risk reduction         Relevant Medications    pravastatin (PRAVACHOL) 20 MG tablet       Endocrine    Prediabetes (Chronic)    Current Assessment & Plan     The patient is asked to make an attempt to improve diet and exercise patterns to aid in medical management of this problem.     Focus on changing from Pepsi to something else.  Discussed tea sweetened with Stevia          Relevant Medications    metFORMIN (GLUCOPHAGE-XR) 500 MG ER 24hr tablet    Other Relevant Orders    Hemoglobin A1C    Basic Metabolic Panel         Health Maintenance reviewed, counseled on vaccines.    Follow-up: Follow up in about 6 months (around 7/31/2023) for Routine Physical.  ______________________________________________________________________    Chief Complaint  Chief Complaint   Patient presents with    Hypertension    prediabetes       ALBINA Lafleur  Mustapha is a 62 y.o. male with medical diagnoses as listed in the medical history and problem list that presents for HTN and prediabetes follow up.  Pt is known to me with their last appointment 2022.  He had repeat labs prior to this OV that showed generally reassuring CMP.  A1c remains 6.3%    Taking and tolerating medications as prescribed without perceived side effects.  No CP, SOB, palpitations, hypoglycemic symptoms.  Still drinking 2-3 Pepsi a day.     He is requesting a referral to derm about the dermatitis he is having due to tattoo ink.      The 10-year ASCVD risk score (Yee GRAYSON, et al., 2019) is: 11.1%    Values used to calculate the score:      Age: 62 years      Sex: Male      Is Non- : No      Diabetic: No      Tobacco smoker: No      Systolic Blood Pressure: 122 mmHg      Is BP treated: Yes      HDL Cholesterol: 41 mg/dL      Total Cholesterol: 192 mg/dL      PAST MEDICAL HISTORY:  Past Medical History:   Diagnosis Date    Depression     Hypertension     Prediabetes        PAST SURGICAL HISTORY:  Past Surgical History:   Procedure Laterality Date    COLONOSCOPY N/A 2016    Procedure: COLONOSCOPY;  Surgeon: Fortino Perez MD;  Location: [facility];  Service: Endoscopy;  Laterality: N/A;    EYE SURGERY      lens implant    FINGER AMPUTATION      NASAL SEPTUM SURGERY      VASECTOMY         SOCIAL HISTORY:  Social History     Socioeconomic History    Marital status:    Tobacco Use    Smoking status: Former     Packs/day: 1.50     Years: 35.00     Pack years: 52.50     Types: Cigarettes     Quit date: 2014     Years since quittin.5    Smokeless tobacco: Former     Quit date: 2015   Substance and Sexual Activity    Alcohol use: Yes     Alcohol/week: 2.0 standard drinks     Types: 2 Cans of beer per week     Comment: a week     Drug use: Never    Sexual activity: Yes     Partners: Female       FAMILY HISTORY:  History reviewed. No pertinent family  "history.    ALLERGIES AND MEDICATIONS: updated and reviewed.  Review of patient's allergies indicates:   Allergen Reactions    Codeine Nausea Only     Current Outpatient Medications   Medication Sig Dispense Refill    fish oil-omega-3 fatty acids 300-1,000 mg capsule Take 2 g by mouth once daily.      triamcinolone (KENALOG) 0.5 % ointment Apply topically 2 (two) times daily as needed (rash on leg). Do not apply to face, armpits, or groin 15 g 2    citalopram (CELEXA) 40 MG tablet TAKE 1 TABLET BY MOUTH EVERY DAY 90 tablet 3    losartan (COZAAR) 50 MG tablet Take 1 tablet (50 mg total) by mouth once daily. 90 tablet 3    metFORMIN (GLUCOPHAGE-XR) 500 MG ER 24hr tablet Take 1 tablet (500 mg total) by mouth once daily. 90 tablet 3    pravastatin (PRAVACHOL) 20 MG tablet Take 1 tablet (20 mg total) by mouth once daily. 90 tablet 3    ranitidine (ZANTAC) 75 MG tablet Take 75 mg by mouth 2 (two) times daily.       No current facility-administered medications for this visit.         ROS  Review of Systems   Constitutional:  Negative for chills, fever and unexpected weight change.   Respiratory:  Negative for cough, chest tightness, shortness of breath and wheezing.    Cardiovascular:  Negative for chest pain, palpitations and leg swelling.   Gastrointestinal:  Negative for abdominal pain and blood in stool.   Genitourinary:  Negative for decreased urine volume, dysuria and hematuria.   Musculoskeletal:  Negative for arthralgias and myalgias.   Skin:  Positive for rash.   Neurological:  Negative for dizziness, weakness, light-headedness and headaches.   Psychiatric/Behavioral:  Negative for decreased concentration, dysphoric mood, sleep disturbance and suicidal ideas.          Physical Exam  Vitals:    01/31/23 1451   BP: 122/70   Pulse: 85   Temp: 97.8 °F (36.6 °C)   SpO2: 95%   Weight: 91.8 kg (202 lb 6.1 oz)   Height: 5' 8" (1.727 m)    Body mass index is 30.77 kg/m².  Weight: 91.8 kg (202 lb 6.1 oz)   Height: 5' 8" " (172.7 cm)   Physical Exam  Constitutional:       General: He is not in acute distress.     Appearance: Normal appearance. He is well-developed.   HENT:      Head: Normocephalic and atraumatic.   Eyes:      Extraocular Movements: Extraocular movements intact.      Conjunctiva/sclera: Conjunctivae normal.   Cardiovascular:      Rate and Rhythm: Normal rate and regular rhythm.   Pulmonary:      Effort: Pulmonary effort is normal. No respiratory distress.      Breath sounds: Normal breath sounds.   Musculoskeletal:      Cervical back: Normal range of motion.   Neurological:      General: No focal deficit present.      Mental Status: He is alert and oriented to person, place, and time.   Psychiatric:         Mood and Affect: Mood and affect normal.         Behavior: Behavior normal.         Thought Content: Thought content normal.         Judgment: Judgment normal.           Health Maintenance         Date Due Completion Date    LDCT Lung Screen Never done ---    Shingles Vaccine (1 of 2) Never done ---    COVID-19 Vaccine (4 - Booster for Pfizer series) 03/27/2022 1/30/2022    Influenza Vaccine (1) 09/01/2022 10/5/2020    Hemoglobin A1c (Prediabetes) 01/20/2024 1/20/2023    Colorectal Cancer Screening 04/08/2026 4/8/2016    Lipid Panel 07/22/2027 7/22/2022    TETANUS VACCINE 10/05/2030 10/5/2020    Override on 10/16/2009: Done

## 2023-01-31 NOTE — ASSESSMENT & PLAN NOTE
The patient is asked to make an attempt to improve diet and exercise patterns to aid in medical management of this problem.     Focus on changing from Pepsi to something else.  Discussed tea sweetened with Stevia

## 2023-07-28 ENCOUNTER — LAB VISIT (OUTPATIENT)
Dept: LAB | Facility: HOSPITAL | Age: 63
End: 2023-07-28
Attending: INTERNAL MEDICINE
Payer: COMMERCIAL

## 2023-07-28 DIAGNOSIS — R73.03 PREDIABETES: Chronic | ICD-10-CM

## 2023-07-28 DIAGNOSIS — I10 ESSENTIAL HYPERTENSION: Chronic | ICD-10-CM

## 2023-07-28 LAB
ANION GAP SERPL CALC-SCNC: 10 MMOL/L (ref 8–16)
BASOPHILS # BLD AUTO: 0.04 K/UL (ref 0–0.2)
BASOPHILS NFR BLD: 0.6 % (ref 0–1.9)
BUN SERPL-MCNC: 15 MG/DL (ref 8–23)
CALCIUM SERPL-MCNC: 8.9 MG/DL (ref 8.7–10.5)
CHLORIDE SERPL-SCNC: 106 MMOL/L (ref 95–110)
CHOLEST SERPL-MCNC: 174 MG/DL (ref 120–199)
CHOLEST/HDLC SERPL: 4.5 {RATIO} (ref 2–5)
CO2 SERPL-SCNC: 25 MMOL/L (ref 23–29)
CREAT SERPL-MCNC: 1 MG/DL (ref 0.5–1.4)
DIFFERENTIAL METHOD: ABNORMAL
EOSINOPHIL # BLD AUTO: 0.2 K/UL (ref 0–0.5)
EOSINOPHIL NFR BLD: 3.4 % (ref 0–8)
ERYTHROCYTE [DISTWIDTH] IN BLOOD BY AUTOMATED COUNT: 13.1 % (ref 11.5–14.5)
EST. GFR  (NO RACE VARIABLE): >60 ML/MIN/1.73 M^2
ESTIMATED AVG GLUCOSE: 131 MG/DL (ref 68–131)
GLUCOSE SERPL-MCNC: 115 MG/DL (ref 70–110)
HBA1C MFR BLD: 6.2 % (ref 4–5.6)
HCT VFR BLD AUTO: 42.6 % (ref 40–54)
HDLC SERPL-MCNC: 39 MG/DL (ref 40–75)
HDLC SERPL: 22.4 % (ref 20–50)
HGB BLD-MCNC: 13.7 G/DL (ref 14–18)
IMM GRANULOCYTES # BLD AUTO: 0.02 K/UL (ref 0–0.04)
IMM GRANULOCYTES NFR BLD AUTO: 0.3 % (ref 0–0.5)
LDLC SERPL CALC-MCNC: 115.4 MG/DL (ref 63–159)
LYMPHOCYTES # BLD AUTO: 2.2 K/UL (ref 1–4.8)
LYMPHOCYTES NFR BLD: 34.6 % (ref 18–48)
MCH RBC QN AUTO: 28.7 PG (ref 27–31)
MCHC RBC AUTO-ENTMCNC: 32.2 G/DL (ref 32–36)
MCV RBC AUTO: 89 FL (ref 82–98)
MONOCYTES # BLD AUTO: 0.4 K/UL (ref 0.3–1)
MONOCYTES NFR BLD: 6.8 % (ref 4–15)
NEUTROPHILS # BLD AUTO: 3.4 K/UL (ref 1.8–7.7)
NEUTROPHILS NFR BLD: 54.3 % (ref 38–73)
NONHDLC SERPL-MCNC: 135 MG/DL
NRBC BLD-RTO: 0 /100 WBC
PLATELET # BLD AUTO: 235 K/UL (ref 150–450)
PMV BLD AUTO: 10.8 FL (ref 9.2–12.9)
POTASSIUM SERPL-SCNC: 3.8 MMOL/L (ref 3.5–5.1)
RBC # BLD AUTO: 4.78 M/UL (ref 4.6–6.2)
SODIUM SERPL-SCNC: 141 MMOL/L (ref 136–145)
TRIGL SERPL-MCNC: 98 MG/DL (ref 30–150)
WBC # BLD AUTO: 6.22 K/UL (ref 3.9–12.7)

## 2023-07-28 PROCEDURE — 85025 COMPLETE CBC W/AUTO DIFF WBC: CPT | Performed by: INTERNAL MEDICINE

## 2023-07-28 PROCEDURE — 83036 HEMOGLOBIN GLYCOSYLATED A1C: CPT | Performed by: INTERNAL MEDICINE

## 2023-07-28 PROCEDURE — 80061 LIPID PANEL: CPT | Performed by: INTERNAL MEDICINE

## 2023-07-28 PROCEDURE — 36415 COLL VENOUS BLD VENIPUNCTURE: CPT | Mod: PO | Performed by: INTERNAL MEDICINE

## 2023-07-28 PROCEDURE — 80048 BASIC METABOLIC PNL TOTAL CA: CPT | Performed by: INTERNAL MEDICINE

## 2023-08-17 NOTE — TELEPHONE ENCOUNTER
Left mess for pt to call ofc  
Refill approved.  Due for physical with labs prior.  Please sched.    Thank you,  Pepe   
impairments found

## 2023-08-25 ENCOUNTER — OFFICE VISIT (OUTPATIENT)
Dept: FAMILY MEDICINE | Facility: CLINIC | Age: 63
End: 2023-08-25
Payer: COMMERCIAL

## 2023-08-25 VITALS
HEIGHT: 68 IN | WEIGHT: 206.44 LBS | BODY MASS INDEX: 31.29 KG/M2 | SYSTOLIC BLOOD PRESSURE: 130 MMHG | TEMPERATURE: 98 F | OXYGEN SATURATION: 95 % | DIASTOLIC BLOOD PRESSURE: 70 MMHG | HEART RATE: 79 BPM

## 2023-08-25 DIAGNOSIS — E66.9 OBESITY, CLASS I, BMI 30-34.9: Chronic | ICD-10-CM

## 2023-08-25 DIAGNOSIS — I10 ESSENTIAL HYPERTENSION: Chronic | ICD-10-CM

## 2023-08-25 DIAGNOSIS — R73.03 PREDIABETES: Chronic | ICD-10-CM

## 2023-08-25 DIAGNOSIS — Z00.00 ROUTINE MEDICAL EXAM: Primary | ICD-10-CM

## 2023-08-25 DIAGNOSIS — E78.49 OTHER HYPERLIPIDEMIA: Chronic | ICD-10-CM

## 2023-08-25 DIAGNOSIS — Z12.2 ENCOUNTER FOR SCREENING FOR LUNG CANCER: ICD-10-CM

## 2023-08-25 PROCEDURE — 99999 PR PBB SHADOW E&M-EST. PATIENT-LVL IV: CPT | Mod: PBBFAC,,, | Performed by: NURSE PRACTITIONER

## 2023-08-25 PROCEDURE — 3078F PR MOST RECENT DIASTOLIC BLOOD PRESSURE < 80 MM HG: ICD-10-PCS | Mod: CPTII,S$GLB,, | Performed by: NURSE PRACTITIONER

## 2023-08-25 PROCEDURE — 4010F PR ACE/ARB THEARPY RXD/TAKEN: ICD-10-PCS | Mod: CPTII,S$GLB,, | Performed by: NURSE PRACTITIONER

## 2023-08-25 PROCEDURE — 3075F SYST BP GE 130 - 139MM HG: CPT | Mod: CPTII,S$GLB,, | Performed by: NURSE PRACTITIONER

## 2023-08-25 PROCEDURE — 3008F PR BODY MASS INDEX (BMI) DOCUMENTED: ICD-10-PCS | Mod: CPTII,S$GLB,, | Performed by: NURSE PRACTITIONER

## 2023-08-25 PROCEDURE — 3044F HG A1C LEVEL LT 7.0%: CPT | Mod: CPTII,S$GLB,, | Performed by: NURSE PRACTITIONER

## 2023-08-25 PROCEDURE — 3078F DIAST BP <80 MM HG: CPT | Mod: CPTII,S$GLB,, | Performed by: NURSE PRACTITIONER

## 2023-08-25 PROCEDURE — 3008F BODY MASS INDEX DOCD: CPT | Mod: CPTII,S$GLB,, | Performed by: NURSE PRACTITIONER

## 2023-08-25 PROCEDURE — 1160F PR REVIEW ALL MEDS BY PRESCRIBER/CLIN PHARMACIST DOCUMENTED: ICD-10-PCS | Mod: CPTII,S$GLB,, | Performed by: NURSE PRACTITIONER

## 2023-08-25 PROCEDURE — 3075F PR MOST RECENT SYSTOLIC BLOOD PRESS GE 130-139MM HG: ICD-10-PCS | Mod: CPTII,S$GLB,, | Performed by: NURSE PRACTITIONER

## 2023-08-25 PROCEDURE — 99396 PREV VISIT EST AGE 40-64: CPT | Mod: S$GLB,,, | Performed by: NURSE PRACTITIONER

## 2023-08-25 PROCEDURE — 99396 PR PREVENTIVE VISIT,EST,40-64: ICD-10-PCS | Mod: S$GLB,,, | Performed by: NURSE PRACTITIONER

## 2023-08-25 PROCEDURE — 3044F PR MOST RECENT HEMOGLOBIN A1C LEVEL <7.0%: ICD-10-PCS | Mod: CPTII,S$GLB,, | Performed by: NURSE PRACTITIONER

## 2023-08-25 PROCEDURE — 99999 PR PBB SHADOW E&M-EST. PATIENT-LVL IV: ICD-10-PCS | Mod: PBBFAC,,, | Performed by: NURSE PRACTITIONER

## 2023-08-25 PROCEDURE — 4010F ACE/ARB THERAPY RXD/TAKEN: CPT | Mod: CPTII,S$GLB,, | Performed by: NURSE PRACTITIONER

## 2023-08-25 PROCEDURE — 1160F RVW MEDS BY RX/DR IN RCRD: CPT | Mod: CPTII,S$GLB,, | Performed by: NURSE PRACTITIONER

## 2023-08-25 PROCEDURE — 1159F PR MEDICATION LIST DOCUMENTED IN MEDICAL RECORD: ICD-10-PCS | Mod: CPTII,S$GLB,, | Performed by: NURSE PRACTITIONER

## 2023-08-25 PROCEDURE — 1159F MED LIST DOCD IN RCRD: CPT | Mod: CPTII,S$GLB,, | Performed by: NURSE PRACTITIONER

## 2023-08-25 NOTE — PROGRESS NOTES
History of Present Illness   Miquel Luciano is a 62 y.o. man with medical history as listed below who presents today for routine physical exam. He had labs prior to our visit significant for A1c 6.2 and .4. He is not taking medications as prescribed, did not start statin therapy because of perceived SE. Complaints today: none. Pertinent negatives as listed in ROS. We will address HM today.    Past Medical History:   Diagnosis Date    Depression     Hypertension     Prediabetes        Past Surgical History:   Procedure Laterality Date    COLONOSCOPY N/A 2016    Procedure: COLONOSCOPY;  Surgeon: Fortino Perez MD;  Location: [facility];  Service: Endoscopy;  Laterality: N/A;    EYE SURGERY      lens implant    FINGER AMPUTATION      NASAL SEPTUM SURGERY      VASECTOMY         Social History     Socioeconomic History    Marital status:    Tobacco Use    Smoking status: Former     Current packs/day: 0.00     Average packs/day: 1.5 packs/day for 35.0 years (52.5 ttl pk-yrs)     Types: Cigarettes     Start date: 1979     Quit date: 2014     Years since quittin.1     Passive exposure: Past    Smokeless tobacco: Former     Quit date: 2015   Substance and Sexual Activity    Alcohol use: Yes     Alcohol/week: 2.0 standard drinks of alcohol     Types: 2 Cans of beer per week     Comment: a week     Drug use: Never    Sexual activity: Yes     Partners: Female       History reviewed. No pertinent family history.    Review of Systems  Review of Systems   Constitutional:  Negative for malaise/fatigue and weight loss.   HENT:  Negative for hearing loss and tinnitus.    Eyes:  Negative for blurred vision and double vision.   Respiratory:  Negative for shortness of breath and wheezing.    Cardiovascular:  Negative for chest pain, palpitations, orthopnea, claudication and leg swelling.   Gastrointestinal:  Negative for blood in stool and melena.   Genitourinary:  Negative for flank pain and  "hematuria.   Musculoskeletal:  Negative for back pain, joint pain and neck pain.   Skin:  Negative for itching and rash.   Neurological:  Negative for dizziness, loss of consciousness and headaches.   Endo/Heme/Allergies:  Negative for polydipsia.   Psychiatric/Behavioral:  Negative for depression. The patient is not nervous/anxious and does not have insomnia.        A complete review of systems was otherwise negative.    Physical Exam  /70   Pulse 79   Temp 97.5 °F (36.4 °C)   Ht 5' 8" (1.727 m)   Wt 93.6 kg (206 lb 7.4 oz)   SpO2 95%   BMI 31.39 kg/m²   General appearance: alert, appears stated age, cooperative, and no distress  Head: Normocephalic, without obvious abnormality, atraumatic  Eyes: negative findings: lids and lashes normal and conjunctivae and sclerae normal  Ears: normal TM's and external ear canals both ears  Nose: Nares normal. Septum midline. Mucosa normal. No drainage or sinus tenderness.  Throat: lips, mucosa, and tongue normal; teeth and gums normal  Neck: no carotid bruit, no JVD, supple, symmetrical, trachea midline, and thyroid not enlarged, symmetric, no tenderness/mass/nodules  Back: symmetric, no curvature. ROM normal. No CVA tenderness.  Lungs: clear to auscultation bilaterally  Heart: regular rate and rhythm, S1, S2 normal, no murmur, click, rub or gallop  Abdomen: soft, non-tender; bowel sounds normal; no masses,  no organomegaly  Extremities: extremities normal, atraumatic, no cyanosis or edema  Pulses: 2+ and symmetric  Skin: Skin color, texture, turgor normal. No rashes or lesions  Lymph nodes: Cervical, supraclavicular, and axillary nodes normal.  Neurologic: Grossly normal    Assessment/Plan  Routine medical exam  Age appropriate screening recommendations and HM were discussed and updated.  Labs reviewed.  Discussed importance of heart healthy diet and exercise.    Essential hypertension  The current medical regimen is effective;  continue present plan and " medications.    Other hyperlipidemia  The current medical regimen is effective;  continue present plan and medications.  The patient is asked to make an attempt to improve diet and exercise patterns to aid in medical management of this problem.  Labs and follow-up in 6 months.    Prediabetes  The current medical regimen is effective;  continue present plan and medications.  The patient is asked to make an attempt to improve diet and exercise patterns to aid in medical management of this problem.  Labs and follow-up in 6 months.  -     Hemoglobin A1C; Future; Expected date: 02/25/2024  -     Comprehensive Metabolic Panel; Future; Expected date: 02/25/2024    Obesity, Class I, BMI 30-34.9  The patient is asked to make an attempt to improve diet and exercise patterns to aid in medical management of this problem.    Encounter for screening for lung cancer  Routine screening.  -     CT Chest Lung Screening Low Dose; Future; Expected date: 08/25/2023    Patient has verbalized understanding and is in agreement with plan of care.    Follow up in about 6 months (around 2/25/2024) for labs and follow-up.

## 2024-02-24 ENCOUNTER — LAB VISIT (OUTPATIENT)
Dept: LAB | Facility: HOSPITAL | Age: 64
End: 2024-02-24
Attending: INTERNAL MEDICINE
Payer: COMMERCIAL

## 2024-02-24 DIAGNOSIS — R73.03 PREDIABETES: Chronic | ICD-10-CM

## 2024-02-24 LAB
ALBUMIN SERPL BCP-MCNC: 3.7 G/DL (ref 3.5–5.2)
ALP SERPL-CCNC: 80 U/L (ref 55–135)
ALT SERPL W/O P-5'-P-CCNC: 19 U/L (ref 10–44)
ANION GAP SERPL CALC-SCNC: 12 MMOL/L (ref 8–16)
AST SERPL-CCNC: 21 U/L (ref 10–40)
BILIRUB SERPL-MCNC: 0.5 MG/DL (ref 0.1–1)
BUN SERPL-MCNC: 16 MG/DL (ref 8–23)
CALCIUM SERPL-MCNC: 9.6 MG/DL (ref 8.7–10.5)
CHLORIDE SERPL-SCNC: 104 MMOL/L (ref 95–110)
CO2 SERPL-SCNC: 24 MMOL/L (ref 23–29)
CREAT SERPL-MCNC: 1 MG/DL (ref 0.5–1.4)
EST. GFR  (NO RACE VARIABLE): >60 ML/MIN/1.73 M^2
ESTIMATED AVG GLUCOSE: 131 MG/DL (ref 68–131)
GLUCOSE SERPL-MCNC: 106 MG/DL (ref 70–110)
HBA1C MFR BLD: 6.2 % (ref 4–5.6)
POTASSIUM SERPL-SCNC: 4.3 MMOL/L (ref 3.5–5.1)
PROT SERPL-MCNC: 6.6 G/DL (ref 6–8.4)
SODIUM SERPL-SCNC: 140 MMOL/L (ref 136–145)

## 2024-02-24 PROCEDURE — 83036 HEMOGLOBIN GLYCOSYLATED A1C: CPT | Performed by: NURSE PRACTITIONER

## 2024-02-24 PROCEDURE — 80053 COMPREHEN METABOLIC PANEL: CPT | Performed by: NURSE PRACTITIONER

## 2024-02-24 PROCEDURE — 36415 COLL VENOUS BLD VENIPUNCTURE: CPT | Mod: PO | Performed by: NURSE PRACTITIONER

## 2024-03-01 ENCOUNTER — OFFICE VISIT (OUTPATIENT)
Dept: INTERNAL MEDICINE | Facility: CLINIC | Age: 64
End: 2024-03-01
Payer: COMMERCIAL

## 2024-03-01 VITALS
SYSTOLIC BLOOD PRESSURE: 132 MMHG | WEIGHT: 208 LBS | OXYGEN SATURATION: 95 % | DIASTOLIC BLOOD PRESSURE: 70 MMHG | BODY MASS INDEX: 31.52 KG/M2 | HEART RATE: 82 BPM | HEIGHT: 68 IN

## 2024-03-01 DIAGNOSIS — M54.50 CHRONIC BILATERAL LOW BACK PAIN WITHOUT SCIATICA: Chronic | ICD-10-CM

## 2024-03-01 DIAGNOSIS — E78.49 OTHER HYPERLIPIDEMIA: Chronic | ICD-10-CM

## 2024-03-01 DIAGNOSIS — G89.29 CHRONIC BILATERAL LOW BACK PAIN WITHOUT SCIATICA: Chronic | ICD-10-CM

## 2024-03-01 DIAGNOSIS — I10 ESSENTIAL HYPERTENSION: Primary | Chronic | ICD-10-CM

## 2024-03-01 DIAGNOSIS — F33.0 MILD RECURRENT MAJOR DEPRESSION: Chronic | ICD-10-CM

## 2024-03-01 DIAGNOSIS — R73.03 PREDIABETES: Chronic | ICD-10-CM

## 2024-03-01 DIAGNOSIS — K21.9 GERD WITHOUT ESOPHAGITIS: Chronic | ICD-10-CM

## 2024-03-01 DIAGNOSIS — E66.9 OBESITY, CLASS I, BMI 30-34.9: Chronic | ICD-10-CM

## 2024-03-01 PROCEDURE — 3044F HG A1C LEVEL LT 7.0%: CPT | Mod: CPTII,S$GLB,, | Performed by: NURSE PRACTITIONER

## 2024-03-01 PROCEDURE — 1160F RVW MEDS BY RX/DR IN RCRD: CPT | Mod: CPTII,S$GLB,, | Performed by: NURSE PRACTITIONER

## 2024-03-01 PROCEDURE — 3008F BODY MASS INDEX DOCD: CPT | Mod: CPTII,S$GLB,, | Performed by: NURSE PRACTITIONER

## 2024-03-01 PROCEDURE — 3075F SYST BP GE 130 - 139MM HG: CPT | Mod: CPTII,S$GLB,, | Performed by: NURSE PRACTITIONER

## 2024-03-01 PROCEDURE — 99214 OFFICE O/P EST MOD 30 MIN: CPT | Mod: S$GLB,,, | Performed by: NURSE PRACTITIONER

## 2024-03-01 PROCEDURE — 99999 PR PBB SHADOW E&M-EST. PATIENT-LVL IV: CPT | Mod: PBBFAC,,, | Performed by: NURSE PRACTITIONER

## 2024-03-01 PROCEDURE — 3078F DIAST BP <80 MM HG: CPT | Mod: CPTII,S$GLB,, | Performed by: NURSE PRACTITIONER

## 2024-03-01 PROCEDURE — 1159F MED LIST DOCD IN RCRD: CPT | Mod: CPTII,S$GLB,, | Performed by: NURSE PRACTITIONER

## 2024-03-01 NOTE — PROGRESS NOTES
History of Present Illness   Miquel Luciano is a 63 y.o. man with medical history as listed below who presents today for routine follow-up, HTN, HLD, and prediabetes. He did have labs prior to our visit, overall unremarkable. He is taking medications as prescribed without perceived SE. Complaints today: none. Pertinent negatives as listed in ROS. We will address HM today.      Past Medical History:   Diagnosis Date    Depression     Hypertension     Prediabetes        Past Surgical History:   Procedure Laterality Date    COLONOSCOPY N/A 2016    Procedure: COLONOSCOPY;  Surgeon: Fortino Perez MD;  Location: [facility];  Service: Endoscopy;  Laterality: N/A;    EYE SURGERY      lens implant    FINGER AMPUTATION      NASAL SEPTUM SURGERY      VASECTOMY         Social History     Socioeconomic History    Marital status:    Tobacco Use    Smoking status: Former     Current packs/day: 0.00     Average packs/day: 1.5 packs/day for 35.0 years (52.5 ttl pk-yrs)     Types: Cigarettes     Start date: 1979     Quit date: 2014     Years since quittin.6     Passive exposure: Past    Smokeless tobacco: Former     Quit date: 2015   Substance and Sexual Activity    Alcohol use: Yes     Alcohol/week: 2.0 standard drinks of alcohol     Types: 2 Cans of beer per week     Comment: a week     Drug use: Never    Sexual activity: Yes     Partners: Female       History reviewed. No pertinent family history.    Review of Systems  Review of Systems   Constitutional:  Negative for malaise/fatigue and weight loss.   Eyes:  Negative for blurred vision and double vision.   Respiratory:  Negative for shortness of breath.    Cardiovascular:  Negative for chest pain, palpitations, orthopnea, claudication, leg swelling and PND.   Gastrointestinal:  Negative for blood in stool and melena.   Genitourinary:  Negative for flank pain and hematuria.   Neurological:  Negative for dizziness, loss of consciousness and  "headaches.   Endo/Heme/Allergies:  Negative for polydipsia.     A complete review of systems was otherwise negative.    Physical Exam  /70 (BP Location: Left arm, Patient Position: Sitting, BP Method: Large (Manual))   Pulse 82   Ht 5' 8" (1.727 m)   Wt 94.3 kg (208 lb 0.1 oz)   SpO2 95%   BMI 31.63 kg/m²   General appearance: alert, appears stated age, cooperative, and no distress  Neck: no carotid bruit, no JVD, supple, symmetrical, trachea midline, and thyroid not enlarged, symmetric, no tenderness/mass/nodules  Lungs: clear to auscultation bilaterally  Heart: regular rate and rhythm, S1, S2 normal, no murmur, click, rub or gallop  Extremities: extremities normal, atraumatic, no cyanosis or edema  Pulses: 2+ and symmetric  Skin: Skin color, texture, turgor normal. No rashes or lesions  Neurologic: Grossly normal    Assessment/Plan  Essential hypertension  The current medical regimen is effective;  continue present plan and medications.    Other hyperlipidemia  The current medical regimen is effective;  continue present plan and medications.  -     LIPID PANEL; Future; Expected date: 03/01/2024    Prediabetes  The current medical regimen is effective;  continue present plan and medications.  The patient is asked to make an attempt to improve diet and exercise patterns to aid in medical management of this problem.  -     HEMOGLOBIN A1C; Future; Expected date: 03/01/2024  -     BASIC METABOLIC PANEL; Future; Expected date: 03/01/2024    Obesity, Class I, BMI 30-34.9  The patient is asked to make an attempt to improve diet and exercise patterns to aid in medical management of this problem.    GERD without esophagitis  The current medical regimen is effective;  continue present plan and medications.    Chronic bilateral low back pain without sciatica  The current medical regimen is effective;  continue present plan and medications.    Mild recurrent major depression  The current medical regimen is " effective;  continue present plan and medications.    Patient has verbalized understanding and is in agreement with plan of care.    Follow up in about 6 months (around 9/1/2024) for labs and physical.

## 2024-03-06 ENCOUNTER — TELEPHONE (OUTPATIENT)
Dept: INTERNAL MEDICINE | Facility: CLINIC | Age: 64
End: 2024-03-06
Payer: COMMERCIAL

## 2024-03-06 NOTE — TELEPHONE ENCOUNTER
----- Message from Glory Buenrostro NP sent at 3/1/2024  2:39 PM CST -----  Labs and follow-up in 6 months with me or Dr. Kc.  Stop at [facility] and set up My Chart.

## 2024-06-10 ENCOUNTER — PATIENT MESSAGE (OUTPATIENT)
Dept: INTERNAL MEDICINE | Facility: CLINIC | Age: 64
End: 2024-06-10
Payer: COMMERCIAL

## 2024-06-25 PROBLEM — E78.00 PURE HYPERCHOLESTEROLEMIA: Status: ACTIVE | Noted: 2023-01-31

## 2024-11-27 ENCOUNTER — TELEPHONE (OUTPATIENT)
Dept: INTERNAL MEDICINE | Facility: CLINIC | Age: 64
End: 2024-11-27
Payer: COMMERCIAL

## 2024-11-27 NOTE — TELEPHONE ENCOUNTER
----- Message from Evangelist sent at 11/27/2024  1:34 PM CST -----  Regarding: self  Type: Patient Call Back    Who called:self    What is the request in detail:calling to get labs sent to his chart for a 6 month f/u    Can the clinic reply by [facility]?no    Would the patient rather a call back or a response via [facility]? callback    Best call back number:[phone]    Additional Information:

## 2024-11-27 NOTE — TELEPHONE ENCOUNTER
Lab orders are already in just need to be scheduled. Sending portal msg for scheduling information.

## 2024-12-11 ENCOUNTER — LAB VISIT (OUTPATIENT)
Dept: LAB | Facility: HOSPITAL | Age: 64
End: 2024-12-11
Attending: INTERNAL MEDICINE
Payer: COMMERCIAL

## 2024-12-11 DIAGNOSIS — R73.03 PREDIABETES: Chronic | ICD-10-CM

## 2024-12-11 DIAGNOSIS — E78.49 OTHER HYPERLIPIDEMIA: Chronic | ICD-10-CM

## 2024-12-11 LAB
ANION GAP SERPL CALC-SCNC: 7 MMOL/L (ref 8–16)
BUN SERPL-MCNC: 15 MG/DL (ref 8–23)
CALCIUM SERPL-MCNC: 9.2 MG/DL (ref 8.7–10.5)
CHLORIDE SERPL-SCNC: 105 MMOL/L (ref 95–110)
CHOLEST SERPL-MCNC: 192 MG/DL (ref 120–199)
CHOLEST/HDLC SERPL: 4.6 {RATIO} (ref 2–5)
CO2 SERPL-SCNC: 27 MMOL/L (ref 23–29)
CREAT SERPL-MCNC: 1.1 MG/DL (ref 0.5–1.4)
EST. GFR  (NO RACE VARIABLE): >60 ML/MIN/1.73 M^2
ESTIMATED AVG GLUCOSE: 137 MG/DL (ref 68–131)
GLUCOSE SERPL-MCNC: 118 MG/DL (ref 70–110)
HBA1C MFR BLD: 6.4 % (ref 4–5.6)
HDLC SERPL-MCNC: 42 MG/DL (ref 40–75)
HDLC SERPL: 21.9 % (ref 20–50)
LDLC SERPL CALC-MCNC: 129.8 MG/DL (ref 63–159)
NONHDLC SERPL-MCNC: 150 MG/DL
POTASSIUM SERPL-SCNC: 4.4 MMOL/L (ref 3.5–5.1)
SODIUM SERPL-SCNC: 139 MMOL/L (ref 136–145)
TRIGL SERPL-MCNC: 101 MG/DL (ref 30–150)

## 2024-12-11 PROCEDURE — 36415 COLL VENOUS BLD VENIPUNCTURE: CPT | Mod: PO | Performed by: NURSE PRACTITIONER

## 2024-12-11 PROCEDURE — 80061 LIPID PANEL: CPT | Performed by: NURSE PRACTITIONER

## 2024-12-11 PROCEDURE — 83036 HEMOGLOBIN GLYCOSYLATED A1C: CPT | Performed by: NURSE PRACTITIONER

## 2024-12-11 PROCEDURE — 80048 BASIC METABOLIC PNL TOTAL CA: CPT | Performed by: NURSE PRACTITIONER

## 2024-12-16 ENCOUNTER — OFFICE VISIT (OUTPATIENT)
Dept: INTERNAL MEDICINE | Facility: CLINIC | Age: 64
End: 2024-12-16
Payer: COMMERCIAL

## 2024-12-16 VITALS
WEIGHT: 211.75 LBS | HEIGHT: 68 IN | HEART RATE: 84 BPM | OXYGEN SATURATION: 96 % | SYSTOLIC BLOOD PRESSURE: 136 MMHG | BODY MASS INDEX: 32.09 KG/M2 | DIASTOLIC BLOOD PRESSURE: 60 MMHG

## 2024-12-16 DIAGNOSIS — I10 ESSENTIAL HYPERTENSION: Chronic | ICD-10-CM

## 2024-12-16 DIAGNOSIS — Z23 NEED FOR VACCINATION: ICD-10-CM

## 2024-12-16 DIAGNOSIS — E66.811 OBESITY, CLASS I, BMI 30-34.9: Chronic | ICD-10-CM

## 2024-12-16 DIAGNOSIS — R73.03 PREDIABETES: Chronic | ICD-10-CM

## 2024-12-16 DIAGNOSIS — Z00.00 ROUTINE MEDICAL EXAM: Primary | ICD-10-CM

## 2024-12-16 DIAGNOSIS — Z87.891 FORMER SMOKER: ICD-10-CM

## 2024-12-16 DIAGNOSIS — F33.0 MILD RECURRENT MAJOR DEPRESSION: Chronic | ICD-10-CM

## 2024-12-16 DIAGNOSIS — E78.49 OTHER HYPERLIPIDEMIA: Chronic | ICD-10-CM

## 2024-12-16 PROCEDURE — 4010F ACE/ARB THERAPY RXD/TAKEN: CPT | Mod: CPTII,S$GLB,, | Performed by: INTERNAL MEDICINE

## 2024-12-16 PROCEDURE — 3008F BODY MASS INDEX DOCD: CPT | Mod: CPTII,S$GLB,, | Performed by: INTERNAL MEDICINE

## 2024-12-16 PROCEDURE — 90471 IMMUNIZATION ADMIN: CPT | Mod: S$GLB,,, | Performed by: INTERNAL MEDICINE

## 2024-12-16 PROCEDURE — 3075F SYST BP GE 130 - 139MM HG: CPT | Mod: CPTII,S$GLB,, | Performed by: INTERNAL MEDICINE

## 2024-12-16 PROCEDURE — 3078F DIAST BP <80 MM HG: CPT | Mod: CPTII,S$GLB,, | Performed by: INTERNAL MEDICINE

## 2024-12-16 PROCEDURE — 99396 PREV VISIT EST AGE 40-64: CPT | Mod: 25,S$GLB,, | Performed by: INTERNAL MEDICINE

## 2024-12-16 PROCEDURE — 99999 PR PBB SHADOW E&M-EST. PATIENT-LVL IV: CPT | Mod: PBBFAC,,, | Performed by: INTERNAL MEDICINE

## 2024-12-16 PROCEDURE — 1160F RVW MEDS BY RX/DR IN RCRD: CPT | Mod: CPTII,S$GLB,, | Performed by: INTERNAL MEDICINE

## 2024-12-16 PROCEDURE — 3044F HG A1C LEVEL LT 7.0%: CPT | Mod: CPTII,S$GLB,, | Performed by: INTERNAL MEDICINE

## 2024-12-16 PROCEDURE — 90656 IIV3 VACC NO PRSV 0.5 ML IM: CPT | Mod: S$GLB,,, | Performed by: INTERNAL MEDICINE

## 2024-12-16 PROCEDURE — 1159F MED LIST DOCD IN RCRD: CPT | Mod: CPTII,S$GLB,, | Performed by: INTERNAL MEDICINE

## 2024-12-16 PROCEDURE — 99214 OFFICE O/P EST MOD 30 MIN: CPT | Mod: 25,S$GLB,, | Performed by: INTERNAL MEDICINE

## 2024-12-16 RX ORDER — PRAVASTATIN SODIUM 40 MG/1
40 TABLET ORAL DAILY
Qty: 90 TABLET | Refills: 3 | Status: SHIPPED | OUTPATIENT
Start: 2024-12-16

## 2024-12-16 RX ORDER — CITALOPRAM 40 MG/1
TABLET, FILM COATED ORAL
Qty: 90 TABLET | Refills: 3 | Status: SHIPPED | OUTPATIENT
Start: 2024-12-16

## 2024-12-16 RX ORDER — LOSARTAN POTASSIUM 50 MG/1
50 TABLET ORAL DAILY
Qty: 90 TABLET | Refills: 3 | Status: SHIPPED | OUTPATIENT
Start: 2024-12-16

## 2024-12-16 RX ORDER — METFORMIN HYDROCHLORIDE 500 MG/1
500 TABLET, EXTENDED RELEASE ORAL DAILY
Qty: 90 TABLET | Refills: 3 | Status: SHIPPED | OUTPATIENT
Start: 2024-12-16

## 2024-12-16 NOTE — PATIENT INSTRUCTIONS
I would contact your insurance provider and ask them if they will cover Wegovy or Zepbound for a diagnosis of Obesity to help you with weight loss.  Make sure they know that this is for a diagnosis of obesity and not Type 2 Diabetes.      You can tell them that you have prediabetes on Metformin    If they talk to you about Ozempic, Mounjaro, Trulicity, or Rybelsus, make sure that they know you DO NOT have Type 2 diabetes since none of these options are FDA approved for weight loss/the treatment of Obesity.-1

## 2024-12-16 NOTE — ASSESSMENT & PLAN NOTE
On metformin.  The current medical regimen is effective;  continue present plan and medications. The patient is asked to make an attempt to improve diet and exercise patterns to aid in medical management of this problem.

## 2024-12-16 NOTE — PROGRESS NOTES
Assessment & Plan  1. Routine medical exam  -    Discussed healthy diet, regular exercise, necessary labs, age appropriate cancer screening, and routine vaccinations.     -     Hemoglobin A1C; Future; Expected date: 11/16/2025  -     CBC Without Differential; Future; Expected date: 11/16/2025  -     Comprehensive Metabolic Panel; Future; Expected date: 11/16/2025  -     Lipid Panel; Future; Expected date: 11/16/2025    2. Essential hypertension  Assessment & Plan:  On losartan.  The current medical regimen is effective;  continue present plan and medications.     Orders:  -     losartan (COZAAR) 50 MG tablet; Take 1 tablet (50 mg total) by mouth once daily.  Dispense: 90 tablet; Refill: 3  -     CBC Without Differential; Future; Expected date: 11/16/2025  -     Comprehensive Metabolic Panel; Future; Expected date: 11/16/2025  -     Lipid Panel; Future; Expected date: 11/16/2025    3. Other hyperlipidemia  Overview:  CVD risk score 11.1%    Assessment & Plan:  Increase dose to 40mg for optimization of LDL.  Next step change to atorvastatin or rosuvastatin    Orders:  -     pravastatin (PRAVACHOL) 40 MG tablet; Take 1 tablet (40 mg total) by mouth once daily.  Dispense: 90 tablet; Refill: 3    4. Prediabetes  Assessment & Plan:  On metformin.  The current medical regimen is effective;  continue present plan and medications. The patient is asked to make an attempt to improve diet and exercise patterns to aid in medical management of this problem.     Orders:  -     metFORMIN (GLUCOPHAGE-XR) 500 MG ER 24hr tablet; Take 1 tablet (500 mg total) by mouth once daily.  Dispense: 90 tablet; Refill: 3  -     Hemoglobin A1C; Future; Expected date: 11/16/2025    5. Obesity, Class I, BMI 30-34.9  Assessment & Plan:  The patient's BMI has been recorded in the chart. The patient has been provided educational materials regarding the benefits of attaining and maintaining a normal weight. We will continue to address and follow this  issue during follow up visits.       6. Mild recurrent major depression  Assessment & Plan:  On celexa. The current medical regimen is effective;  continue present plan and medications.     Orders:  -     citalopram (CELEXA) 40 MG tablet; TAKE 1 TABLET BY MOUTH EVERY DAY  Dispense: 90 tablet; Refill: 3    7. Former smoker  -     CT Chest Lung Screening Low Dose; Future; Expected date: 12/16/2024        Assessment & Plan    IMPRESSION:  - Assessed patient's eligibility for weight loss medication, considering safety and insurance coverage  - Evaluated A1C increase to 6.4%, indicating pre-diabetes progression  - Noted cholesterol elevation, particularly LDL increase to 130 from 115  - Determined need to adjust cholesterol medication dosage  - Considered lung cancer screening due to patient's smoking history    OBESITY AND WEIGHT MANAGEMENT:  - Emphasized the importance of dietary changes in conjunction with potential weight loss medication.  - Contacted [employer] to inquire about coverage for Wegovy or Zepbound for obesity treatment.  - Messaged regarding [employer] response about weight loss medication coverage.    DIETARY COUNSELING:  - Discussed the negative health impacts of soft drink consumption.  - Considered completely eliminating soft drink consumption, especially before the upcoming wedding.  - Transitioned to drinking water instead of soft drinks.    TYPE 2 DIABETES MELLITUS:  - Refilled Metformin.    HYPERLIPIDEMIA:  - Increased Pravastatin from 20 mg to 40 mg daily.    TOBACCO USE AND LUNG CANCER SCREENING:  - Informed about the purpose and process of low dose CT for lung cancer screening.  - Ordered low dose CT (LDCT) for lung cancer screening.    PREVENTIVE CARE AND VACCINATIONS:  - Educated on the benefits of flu and RSV vaccines in reducing risk of severe illness and hospitalization.  - Administered flu vaccine in office.  - Administered RSV vaccine in office.    GENERAL MEDICAL MANAGEMENT:  -  Provided 1 year of refills for current medications.  - Visited the [facility] for assistance with resetting patient portal password.           Health Maintenance reviewed, flu and RSV today.    Follow-up: Follow up in about 1 year (around 12/16/2025) for Routine Physical.    This note was generated with the assistance of ambient listening technology. Verbal consent was obtained by the patient and accompanying visitor(s) for the recording of patient appointment to facilitate this note. I attest to having reviewed and edited the generated note for accuracy, though some syntax or spelling errors may persist. Please contact the author of this note for any clarification.      ______________________________________________________________________    Chief Complaint  Chief Complaint   Patient presents with    Follow-up    Weight Loss     Medication       Their last appointment in this dept was March 2024.      History of Present Illness    CHIEF COMPLAINT:  Mr. Luciano presents today for a routine physical and to discuss weight loss medication.    WEIGHT MANAGEMENT:  He expresses interest in weight loss medications to improve appearance for daughter's wedding in April. He has heard about their benefits for both diabetes and weight management. He does not have type 2 diabetes.    DIET:  He consumes 2-3 soft drinks daily despite attempts to reduce intake. His wife drinks water and purchases fewer soft drinks. He includes fruits like bananas and grapes in his diet.    MEDICATIONS:  He was previously taking Metformin but his prescription ran out a couple weeks ago. He continues Pravastatin 20 mg for cholesterol.    LABS:  A1C is 6.4%. Cholesterol has increased to 130 from 115 last year. Blood sugar was 118 on the 11th. Electrolytes and kidney function tests are normal.    SOCIAL HISTORY:  He quit smoking 10 years ago.      ROS:  General: -weight loss              ROS  Review of Systems   Constitutional:  Negative for chills,  "fever and unexpected weight change.   Respiratory:  Negative for cough, chest tightness, shortness of breath and wheezing.    Cardiovascular:  Negative for chest pain, palpitations and leg swelling.   Gastrointestinal:  Negative for abdominal pain and blood in stool.   Genitourinary:  Negative for decreased urine volume, dysuria and hematuria.   Musculoskeletal:  Negative for arthralgias and myalgias.   Neurological:  Negative for dizziness, weakness, light-headedness and headaches.   Psychiatric/Behavioral:  Negative for decreased concentration, dysphoric mood, sleep disturbance and suicidal ideas.            Physical Exam  Vitals:    12/16/24 0903   BP: 136/60   BP Location: Right arm   Patient Position: Sitting   Pulse: 84   SpO2: 96%   Weight: 96.1 kg (211 lb 12 oz)   Height: 5' 8" (1.727 m)    Body mass index is 32.2 kg/m².  Weight: 96.1 kg (211 lb 12 oz)   Height: 5' 8" (172.7 cm)   Physical Exam  Constitutional:       General: He is not in acute distress.     Appearance: He is well-developed.   HENT:      Head: Normocephalic and atraumatic.   Eyes:      General: Lids are normal. No scleral icterus.     Conjunctiva/sclera: Conjunctivae normal.      Pupils: Pupils are equal, round, and reactive to light.   Neck:      Thyroid: No thyromegaly.      Vascular: No carotid bruit or JVD.   Cardiovascular:      Rate and Rhythm: Normal rate and regular rhythm.      Heart sounds: Normal heart sounds. No murmur heard.     No friction rub. No S3 or S4 sounds.   Pulmonary:      Effort: Pulmonary effort is normal.      Breath sounds: Normal breath sounds. No wheezing, rhonchi or rales.   Abdominal:      General: Bowel sounds are normal. There is no distension.      Palpations: Abdomen is soft.      Tenderness: There is no abdominal tenderness.   Musculoskeletal:         General: No tenderness.      Cervical back: Full passive range of motion without pain and neck supple.      Right lower leg: No edema.      Left lower leg: " No edema.      Comments: Old Partial right Finger amputation noted   Skin:     General: Skin is warm and dry.      Findings: No rash.   Neurological:      Mental Status: He is alert and oriented to person, place, and time.   Psychiatric:         Speech: Speech normal.         Behavior: Behavior normal.         Thought Content: Thought content normal.

## 2025-04-22 ENCOUNTER — OFFICE VISIT (OUTPATIENT)
Dept: INTERNAL MEDICINE | Facility: CLINIC | Age: 65
End: 2025-04-22
Payer: COMMERCIAL

## 2025-04-22 ENCOUNTER — HOSPITAL ENCOUNTER (OUTPATIENT)
Dept: RADIOLOGY | Facility: HOSPITAL | Age: 65
Discharge: HOME | End: 2025-04-22
Attending: INTERNAL MEDICINE
Payer: COMMERCIAL

## 2025-04-22 VITALS
HEART RATE: 74 BPM | OXYGEN SATURATION: 94 % | BODY MASS INDEX: 30.07 KG/M2 | SYSTOLIC BLOOD PRESSURE: 134 MMHG | HEIGHT: 68 IN | WEIGHT: 198.44 LBS | DIASTOLIC BLOOD PRESSURE: 82 MMHG

## 2025-04-22 DIAGNOSIS — J30.9 ALLERGIC RHINITIS, UNSPECIFIED SEASONALITY, UNSPECIFIED TRIGGER: ICD-10-CM

## 2025-04-22 DIAGNOSIS — R06.02 SHORTNESS OF BREATH: ICD-10-CM

## 2025-04-22 DIAGNOSIS — R05.1 ACUTE COUGH: ICD-10-CM

## 2025-04-22 DIAGNOSIS — R05.1 ACUTE COUGH: Primary | ICD-10-CM

## 2025-04-22 PROCEDURE — 99999 PR PBB SHADOW E&M-EST. PATIENT-LVL IV: CPT | Mod: PBBFAC,,, | Performed by: INTERNAL MEDICINE

## 2025-04-22 PROCEDURE — 71046 X-RAY EXAM CHEST 2 VIEWS: CPT | Mod: 26,,, | Performed by: INTERNAL MEDICINE

## 2025-04-22 PROCEDURE — 4010F ACE/ARB THERAPY RXD/TAKEN: CPT | Mod: CPTII,S$GLB,, | Performed by: INTERNAL MEDICINE

## 2025-04-22 PROCEDURE — 1159F MED LIST DOCD IN RCRD: CPT | Mod: CPTII,S$GLB,, | Performed by: INTERNAL MEDICINE

## 2025-04-22 PROCEDURE — 3008F BODY MASS INDEX DOCD: CPT | Mod: CPTII,S$GLB,, | Performed by: INTERNAL MEDICINE

## 2025-04-22 PROCEDURE — 3079F DIAST BP 80-89 MM HG: CPT | Mod: CPTII,S$GLB,, | Performed by: INTERNAL MEDICINE

## 2025-04-22 PROCEDURE — 71046 X-RAY EXAM CHEST 2 VIEWS: CPT | Mod: TC,FY

## 2025-04-22 PROCEDURE — 3075F SYST BP GE 130 - 139MM HG: CPT | Mod: CPTII,S$GLB,, | Performed by: INTERNAL MEDICINE

## 2025-04-22 PROCEDURE — 99214 OFFICE O/P EST MOD 30 MIN: CPT | Mod: S$GLB,,, | Performed by: INTERNAL MEDICINE

## 2025-04-22 RX ORDER — ALBUTEROL SULFATE 90 UG/1
1-2 INHALANT RESPIRATORY (INHALATION)
Qty: 25.5 G | Refills: 0 | Status: SHIPPED | OUTPATIENT
Start: 2025-04-22

## 2025-04-22 NOTE — PROGRESS NOTES
Assessment & Plan  Assessment & Plan    IMPRESSION:  - Assessed symptoms as likely due to combination of allergic response to cherry blossoms and altitude changes during recent travel.  - Ruled out significant pneumonia based on lung auscultation.  - Improving but still experiencing some respiratory symptoms.  - Added albuterol inhaler to current regimen to help open airways and break up mucus.    PLAN SUMMARY:  - Complete course of doxycycline 100 mg twice daily  - Take Mucinex to thin secretions, increase water intake when using  - Carry epinephrine auto-injector at all times  - Continue Astelin nasal spray  - Use prescribed albuterol inhaler for bronchodilation and mucus expectoration  - Continue OTC allergy medication (Claritin/loratadine) daily  - Maintain a food diary to identify potential allergens  - Strictly avoid peanuts, cashews, and other tree nuts  - Ordered chest XR for comprehensive evaluation    UPPER RESPIRATORY TRACT DISEASE:  - Assessed the patient's condition, noting coughing, hacking, and difficulty breathing that started after exposure to cherry blossoms and magnolia trees.  - Symptoms have been improving over time.  - Performed a physical exam and did not detect any signs of pneumonia in the patient's lungs.  - Evaluated that the condition is likely to resolve independently now that the patient is away from allergens and high altitude.  - Ordered a chest XR for comprehensive evaluation, considering the patient's age and concerns about lung function.  - Prescribed an albuterol inhaler to help bronchodilation and mucus expectoration.  - Recommend continuing allergy medication and nasal spray.  - Advised completing the course of doxycycline 100 mg twice daily.  - Suggested Mucinex to help thin secretions, emphasizing the importance of adequate hydration for its effectiveness.  - Explained potential risks of taking antibiotics without proper diagnosis.  - Instructed to continue Astelin nasal  spray.  - Advised the patient to report if dyspnea persists after coughing episodes.  - Mr. Luciano to increase water intake when taking Mucinex.  - Advised to continue OTC allergy medication (Claritin/loratadine) daily.          1. Acute cough  -     albuterol (PROVENTIL/VENTOLIN HFA) 90 mcg/actuation inhaler; Inhale 1-2 puffs into the lungs every 4 to 6 hours as needed for Shortness of Breath (cough).  Dispense: 25.5 g; Refill: 0  -     X-Ray Chest PA And Lateral; Future; Expected date: 04/22/2025    2. Allergic rhinitis, unspecified seasonality, unspecified trigger    3. Shortness of breath  -     X-Ray Chest PA And Lateral; Future; Expected date: 04/22/2025          Follow-up: No follow-ups on file.    This note was generated with the assistance of ambient listening technology. Verbal consent was obtained by the patient and accompanying visitor(s) for the recording of patient appointment to facilitate this note. I attest to having reviewed and edited the generated note for accuracy, though some syntax or spelling errors may persist. Please contact the author of this note for any clarification.      ______________________________________________________________________    Chief Complaint  Chief Complaint   Patient presents with    Chest Congestion       History of Present Illness    CHIEF COMPLAINT:  64-year-old male presents today with cough and breathing difficulties following a [place]    HISTORY OF PRESENT ILLNESS:  He developed acute respiratory symptoms 9 days ago while on a cruise in [place] after exposure to cherry blossoms. Initial symptoms included severe hacking cough and difficulty breathing, with notable shortness of breath following coughing episodes. He experienced mild fever without chills and decreased appetite. His symptoms have improved after 4 days of treatment.    CURRENT MEDICATIONS:  He is taking Loratadine (Claritin) daily, Astelin nasal spray, and Doxycycline 100mg twice daily  "for 5 days.    MEDICAL HISTORY:  He has a history of syncope.    ALLERGIES:  He has allergies to peanuts and cashews, which trigger coughing when consumed.              ROS  Review of Systems        Physical Exam  Vitals:    04/22/25 1516 04/22/25 1538   BP: (!) 144/82 134/82   BP Location: Left arm    Patient Position: Sitting    Pulse: 74    SpO2: (!) 94%    Weight: 90 kg (198 lb 6.6 oz)    Height: 5' 8" (1.727 m)     Body mass index is 30.17 kg/m².  Weight: 90 kg (198 lb 6.6 oz)   Height: 5' 8" (172.7 cm)   Physical Exam  Constitutional:       General: He is not in acute distress.     Appearance: He is well-developed.   HENT:      Head: Normocephalic and atraumatic.   Eyes:      General: Lids are normal. No scleral icterus.     Conjunctiva/sclera: Conjunctivae normal.      Pupils: Pupils are equal, round, and reactive to light.   Neck:      Thyroid: No thyromegaly.      Vascular: No carotid bruit or JVD.   Cardiovascular:      Rate and Rhythm: Normal rate and regular rhythm.      Heart sounds: Normal heart sounds. No murmur heard.     No friction rub. No S3 or S4 sounds.   Pulmonary:      Effort: Pulmonary effort is normal.      Breath sounds: Examination of the right-lower field reveals rales. Examination of the left-lower field reveals rales. Rales present. No wheezing or rhonchi.   Abdominal:      General: Bowel sounds are normal. There is no distension.      Palpations: Abdomen is soft.      Tenderness: There is no guarding.   Musculoskeletal:      Cervical back: Full passive range of motion without pain and neck supple.      Right lower leg: No edema.      Left lower leg: No edema.   Skin:     General: Skin is warm and dry.      Findings: No rash.   Neurological:      Mental Status: He is alert and oriented to person, place, and time.   Psychiatric:         Speech: Speech normal.         Behavior: Behavior normal.         Thought Content: Thought content normal.               "

## 2025-04-23 ENCOUNTER — RESULTS FOLLOW-UP (OUTPATIENT)
Dept: INTERNAL MEDICINE | Facility: CLINIC | Age: 65
End: 2025-04-23

## 2025-04-23 NOTE — PROGRESS NOTES
There are a few changes on this x-ray that are consistent with your symptoms.  It is hard to say if this could be due to a viral infection versus a bacterial pneumonia.  This is why I am having you complete the doxycycline.      Let's stick with our plan as we discussed.  I think it would be a good idea to follow up with Glory in 3 months to make sure that you are getting back to your normal self.     Sincerely,  Joel Kc MD